# Patient Record
Sex: FEMALE | Race: WHITE | Employment: FULL TIME | URBAN - METROPOLITAN AREA
[De-identification: names, ages, dates, MRNs, and addresses within clinical notes are randomized per-mention and may not be internally consistent; named-entity substitution may affect disease eponyms.]

---

## 2020-12-10 ENCOUNTER — HOSPITAL ENCOUNTER (OUTPATIENT)
Dept: SURGERY | Age: 50
Discharge: HOME | End: 2020-12-10

## 2020-12-15 VITALS — WEIGHT: 201 LBS | HEIGHT: 67 IN | BODY MASS INDEX: 31.55 KG/M2

## 2020-12-15 RX ORDER — PEDI MULTIVIT 158/IRON/VIT K1 18MG-10MCG
1 TABLET,CHEWABLE ORAL
COMMUNITY

## 2020-12-15 RX ORDER — TIZANIDINE 4 MG/1
4 TABLET ORAL
COMMUNITY
Start: 2020-12-14 | End: 2020-12-24

## 2020-12-15 RX ORDER — PANTOPRAZOLE SODIUM 20 MG/1
20 TABLET, DELAYED RELEASE ORAL DAILY
COMMUNITY

## 2020-12-15 NOTE — PERIOP NOTES
Patient verified name and     Order for consent not found in EHR and matches case posting; patient verified. Type II surgery    Labs per surgeon: not received. Labs per anesthesia protocol: hgb (pt with CBC,BMP results in EMR dated 2020-all within limits). EKG: not required per anesthesia protocol. A negative Covid swab result is required to proceed with surgery; The testing center is located at the [address], [place]. An appointment is required therefore the patient will be contacted by the Covid swab team. The testing clinic is closed from  for lunch and on weekends. For questions or concerns the patient should call [phone]. Appointment date/time 2020 at [phone] found in EHR and provided to patient. Patient informed of GYN class on 2020 at which time labs will be drawn. Patient will also receive all patient education and hospital approved surgical skin cleanser to use per hospital policy. Patient instructed to hold all vitamins 7 days prior to surgery and NSAIDS 5 days prior to surgery, patient verbalized understanding. Instructed to stop celebrex 5 days prior to surgery. Patient instructed to continue previous medications as prescribed prior to surgery and to take the following medications the day of surgery according to anesthesia guidelines with a small sip of water: protonix, lexapro. Patient answered medical/surgical history questions at their best of ability. All prior to admission medications documented in [facility].

## 2020-12-15 NOTE — PERIOP NOTES
PLEASE CONTINUE TAKING ALL PRESCRIPTION MEDICATIONS UP TO THE DAY OF SURGERY UNLESS OTHERWISE DIRECTED BELOW. DISCONTINUE all vitamins and supplements 7 days prior to surgery. DISCONTINUE Non-Steriodal Anti-Inflammatory (NSAIDS) such as Advil and Aleve 5 days prior to surgery. Home Medications to take  the day of surgery    lexapro  protonix           Home Medications   to Hold   celebrex- 5 days prior to surgery        Comments          *Visitor policy of 1 visitor per patient discussed. Please do not bring home medications with you on the day of surgery unless otherwise directed by your nurse. If you are instructed to bring home medications, please give them to your nurse as they will be administered by the nursing staff. If you have any questions, please call Zina Granados [phone]. A copy of this note was provided to the patient for reference.

## 2020-12-15 NOTE — PERIOP NOTES
Enhanced Recovery After GYN Surgery: non-diabetic patients    Drink Ensure Enlive - one bottle twice daily for five days starting on 12/31/2020 and stopping on 1/4/2021. Do not drink any Ensure Enlive the day before surgery 1/5/2021. Ensure Enlive is the preferred formula over other Ensure formulas as it is the only one that contains CaHMB which helps maintain and rebuild muscle health. It is recommended that you continue drinking this for one month after surgery. The night before surgery 1/5/2021, drink 2 bottles of the Ensure Pre-Surgery drink. The morning of surgery 1/6/2021, drink one bottle of the Ensure Pre-Surgery drink while on your way to the hospital. Drink this over 5-10 minutes. Drink nothing else after drinking the pre-surgical drink the morning of surgery. Bring your patient handbook with you to the hospital.    Things to remember:    1. You will be given clear liquids to drink, advancing diet as tolerated    2. You will be up and moving around with assistance 2-4 hours after surgery. 3. You will be given regularly scheduled pain medications (NSAIDS, Tylenol, Gabapentin) with narcotics as needed. 4. You may be able to go home that night if the surgeon okays and you are up and eating and drinking. Otherwise, your discharge will be the following morning around lunch time. 5. Continue drinking Ensure Enlive for 5 days after surgery.

## 2020-12-16 ENCOUNTER — HOSPITAL ENCOUNTER (OUTPATIENT)
Dept: SURGERY | Age: 50
Discharge: HOME | End: 2020-12-16
Attending: OBSTETRICS & GYNECOLOGY

## 2020-12-16 NOTE — PROGRESS NOTES
Patient attended ERAS/ GYN surgery orientation class today. Detailed instruction book regarding GYN surgery was provided at start of class. Class content included pre-operative instructions for surgery in the week prior to and day before surgery. Packet including Hibiclens and printed instructions on bathing was provided to patient. Detailed and printed diet instruction and presurgical drinks were also given to patient  in accordance with ERAS protocol. Detailed information was given regarding arriving at the hospital and instructions for the patient's day of surgery. Discussed recovery from surgery, hospital stay, pain management, and discharge. Reviewed recovery at home including pelvic rest, driving and activity restrictions, issues requiring call to physician etc. Chuck Palacios all questions in detail. Patient voices understanding of all.

## 2020-12-24 PROBLEM — D25.1 INTRAMURAL, SUBMUCOUS, AND SUBSEROUS LEIOMYOMA OF UTERUS: Status: ACTIVE | Noted: 2020-12-24

## 2020-12-24 PROBLEM — R10.2 PELVIC PAIN IN FEMALE: Status: ACTIVE | Noted: 2020-12-24

## 2020-12-24 PROBLEM — N80.9 ENDOMETRIOSIS DETERMINED BY LAPAROSCOPY: Status: ACTIVE | Noted: 2020-12-24

## 2020-12-24 PROBLEM — D25.0 INTRAMURAL, SUBMUCOUS, AND SUBSEROUS LEIOMYOMA OF UTERUS: Status: ACTIVE | Noted: 2020-12-24

## 2020-12-24 PROBLEM — D25.2 INTRAMURAL, SUBMUCOUS, AND SUBSEROUS LEIOMYOMA OF UTERUS: Status: ACTIVE | Noted: 2020-12-24

## 2020-12-30 ENCOUNTER — HOSPITAL ENCOUNTER (OUTPATIENT)
Dept: MAMMOGRAPHY | Age: 50
Discharge: HOME | End: 2020-12-30
Attending: OBSTETRICS & GYNECOLOGY
Payer: COMMERCIAL

## 2020-12-30 DIAGNOSIS — Z12.39 ENCOUNTER FOR SCREENING FOR MALIGNANT NEOPLASM OF BREAST, UNSPECIFIED SCREENING MODALITY: ICD-10-CM

## 2020-12-30 PROCEDURE — 77063 BREAST TOMOSYNTHESIS BI: CPT

## 2021-01-04 NOTE — H&P (VIEW-ONLY)
HISTORY OF PRESENT ILLNESS Irma Oden is a 53 y.o. female.  () with h/o endometriosis, seen recently with some recurrence of pevic pain symptoms. Had laparoscopy about 18 months ago for Endo, was also told she had a small fibroid at that time. Did well at first but symptoms are returning. Cycles are regular but having more dysmenorrhea and pain, starting a week prior to her cycles and lasting into her cycle. Especially on the left. Has taken naproxen which helps, but has had a previous gastric sleeve so she had to stop. We tried to rx celebrex but her insurance denied. Shayy Estrada  has a past medical history of Abnormal uterine bleeding (AUB), Allergic rhinitis, Anxiety, Depression, HSV infection, Iron deficiency anemia, and Pancreatitis due to common bile duct stone (2018). She also has no past medical history of Abnormal Papanicolaou smear of cervix, Aneurysm ([address] Arrhythmia, Arthritis, Asthma, Autoimmune disease ([address] CAD (coronary artery disease), Cancer ([address] Chronic kidney disease, Chronic obstructive pulmonary disease ([address] Chronic pain, Coagulation disorder ([address] Diabetes ([address] Difficult intubation, Endocarditis, GERD (gastroesophageal reflux disease), Heart failure ([address] Hypertension, Liver disease, Malignant hyperthermia due to anesthesia, Morbid obesity ([address] Nausea & vomiting, Nicotine vapor product user, Non-nicotine vapor product user, Pseudocholinesterase deficiency, PUD (peptic ulcer disease), Rheumatic fever, Seizures ([address] Sleep apnea, Stroke ([address] Thromboembolus ([address] or Thyroid disease. . 
 
OB History  Para Term  AB Living 1 1 1     1 SAB TAB Ectopic Molar Multiple Live Births           1  
  
# Outcome Date GA Lbr Kurtis/2nd Weight Sex Delivery Anes PTL Lv  
1 Term      Vag-Spont  Y SEVERIANO  
   
 
 Her surgeries include  has a past surgical history that includes pr lap,tubal cautery; hx gi; hx cholecystectomy; hx orthopaedic; hx orthopaedic; hx gyn (01/2019); and hx breast reduction (Bilateral). Mavis Pulliam Her current meds are Current Outpatient Medications on File Prior to Visit Medication Sig Dispense Refill  flintstones complete (FLINTSTONES) chewable tablet Take 1 Tab by mouth.  pantoprazole (PROTONIX) 20 mg tablet Take 20 mg by mouth daily.  escitalopram oxalate (Lexapro) 20 mg tablet Take 20 mg by mouth daily.  COLLAGEN by Does Not Apply route.  celecoxib (CELEBREX) 200 mg capsule Take 1 Cap by mouth two (2) times a day. 60 Cap 1 No current facility-administered medications on file prior to visit. No Known Allergies Family history is significant for family history includes Ovarian Cancer in her maternal aunt. Mavis Pulliam Minna Rutledge  reports that she has never smoked. She has never used smokeless tobacco. She reports that she does not drink alcohol or use drugs. HPI Review of Systems Constitutional: Negative. Negative for fever, malaise/fatigue and weight loss. Respiratory: Negative. Cardiovascular: Negative. Gastrointestinal: Positive for heartburn. Negative for blood in stool, constipation and diarrhea. Genitourinary: Negative. Negative for dysuria, frequency, hematuria and urgency. Musculoskeletal: Negative. Negative for falls. Skin: Negative. Neurological: Negative. Endo/Heme/Allergies: Negative. Psychiatric/Behavioral: Negative. Negative for depression. The patient is not nervous/anxious. All other systems reviewed and are negative. Blood pressure 120/76, height 5' 7\" (1.702 m), weight 200 lb 12.8 oz (91.1 kg), last menstrual period 12/04/2020. Body mass index is 31.45 kg/m². Physical Exam 
Exam conducted with a chaperone present. Constitutional:   
   General: She is not in acute distress. Appearance: She is well-developed. Eyes:  
   Extraocular Movements: Extraocular movements intact. Pupils: Pupils are equal, round, and reactive to light. Cardiovascular:  
   Rate and Rhythm: Normal rate and regular rhythm. Pulmonary:  
   Effort: Pulmonary effort is normal. No respiratory distress. Breath sounds: Normal breath sounds. No wheezing. Abdominal:  
   General: Abdomen is flat. There is no distension. Palpations: Abdomen is soft. Tenderness: There is no abdominal tenderness. There is no guarding or rebound. Genitourinary: 
   General: Normal vulva. Exam position: Lithotomy position. Vagina: Normal.  
   Cervix: Normal.  
   Uterus: Enlarged and tender. Skin: 
   General: Skin is warm and dry. Neurological:  
   General: No focal deficit present. Mental Status: She is alert and oriented to person, place, and time. Cranial Nerves: No cranial nerve deficit. Psychiatric:     
   Behavior: Behavior normal.     
   Thought Content: Thought content normal.     
   Judgment: Judgment normal.  
 
 
US obtained showing multiple fibroids, at least are measured. The largest is >3cm on left side. [place] ~ 250cc. There is also a 2.8cm Right ovary cyst that has a 2.8mm septation. Ca125 was WNL ASSESSMENT and PLAN Diagnoses and all orders for this visit: 1. Pelvic pain in female - d/t Endo and fibroids. 
-     AMB POC US, TRANSVAGINAL 2. Endometriosis determined by laparoscopy 3. Intramural, submucous, and subserous leiomyoma of uterus - fibroid etiology, prognosis and treatment options reviewed. Discussed NSAIDs, OCs (neither of which she can do), Acessa or hysterectomy. Pt would like to proceed with Hyst.  Rec robotic hyst, also concerned for recurrent Endo. Procedure and recovery discussed. ERAS discussed. Prefer ovarian preservation. 4. Cyst of right ovary - prob benign but will ck Ca-125 d/t FH.   
-      IN THE PRESENCE OF HAMA Preop Visit Ms. Rebeca Sargent presents for a preop visit. She is scheduled for a Total Laparoscopic Hysterectomy, Bilateral Salpingectomy and Robotic. Aramis Silva Her history, meds, and allergies were reviewed. The procedure was reviewed in detail as well as the risks of bleeding, infection, DVT and potential surgical complications involving the bladder, ureters, colon or intestines. Also the alternatives,  benefits, recovery and follow-up. Prevention of DVT & SSI discussed as indicated. All of her questions were answered. Lauryn Alcala MD 
January 4, 2021 Aramis Silva Follow-up and Dispositions · Return in 25 days (on 1/18/2021) for Postop.

## 2021-01-05 ENCOUNTER — ANESTHESIA EVENT (OUTPATIENT)
Dept: SURGERY | Age: 51
End: 2021-01-05
Payer: COMMERCIAL

## 2021-01-06 ENCOUNTER — ANESTHESIA (OUTPATIENT)
Dept: SURGERY | Age: 51
End: 2021-01-06
Payer: COMMERCIAL

## 2021-01-06 ENCOUNTER — HOSPITAL ENCOUNTER (OUTPATIENT)
Age: 51
Discharge: HOME | End: 2021-01-06
Attending: OBSTETRICS & GYNECOLOGY | Admitting: OBSTETRICS & GYNECOLOGY
Payer: COMMERCIAL

## 2021-01-06 VITALS
BODY MASS INDEX: 31.32 KG/M2 | DIASTOLIC BLOOD PRESSURE: 68 MMHG | WEIGHT: 200 LBS | HEART RATE: 87 BPM | OXYGEN SATURATION: 95 % | TEMPERATURE: 97.6 F | RESPIRATION RATE: 20 BRPM | SYSTOLIC BLOOD PRESSURE: 110 MMHG

## 2021-01-06 DIAGNOSIS — Z90.710 S/P HYSTERECTOMY: ICD-10-CM

## 2021-01-06 DIAGNOSIS — G89.18 POSTOPERATIVE PAIN: Primary | ICD-10-CM

## 2021-01-06 LAB
ABO + RH BLD: NORMAL
BLOOD GROUP ANTIBODIES SERPL: NORMAL
HCG UR QL: NEGATIVE
SPECIMEN EXP DATE BLD: NORMAL

## 2021-01-06 PROCEDURE — 77030031139 HC SUT VCRL2 J&J -A: Performed by: OBSTETRICS & GYNECOLOGY

## 2021-01-06 PROCEDURE — 77030022704 HC SUT VLOC COVD -B: Performed by: OBSTETRICS & GYNECOLOGY

## 2021-01-06 PROCEDURE — 77030010545: Performed by: OBSTETRICS & GYNECOLOGY

## 2021-01-06 PROCEDURE — 77030014064 HC TIP MANIP UTER COOP -C: Performed by: OBSTETRICS & GYNECOLOGY

## 2021-01-06 PROCEDURE — 74011000250 HC RX REV CODE- 250: Performed by: NURSE ANESTHETIST, CERTIFIED REGISTERED

## 2021-01-06 PROCEDURE — C2628 CATHETER, OCCLUSION: HCPCS | Performed by: OBSTETRICS & GYNECOLOGY

## 2021-01-06 PROCEDURE — 77030020703 HC SEAL CANN DISP INTU -B: Performed by: OBSTETRICS & GYNECOLOGY

## 2021-01-06 PROCEDURE — 74011000250 HC RX REV CODE- 250: Performed by: ANESTHESIOLOGY

## 2021-01-06 PROCEDURE — 74011250636 HC RX REV CODE- 250/636: Performed by: ANESTHESIOLOGY

## 2021-01-06 PROCEDURE — 77030016151 HC PROTCTR LNS DFOG COVD -B: Performed by: OBSTETRICS & GYNECOLOGY

## 2021-01-06 PROCEDURE — 77030034696 HC CATH URETH FOL 2W BARD -A: Performed by: OBSTETRICS & GYNECOLOGY

## 2021-01-06 PROCEDURE — 74011250636 HC RX REV CODE- 250/636: Performed by: OBSTETRICS & GYNECOLOGY

## 2021-01-06 PROCEDURE — 74011250636 HC RX REV CODE- 250/636: Performed by: NURSE ANESTHETIST, CERTIFIED REGISTERED

## 2021-01-06 PROCEDURE — 77030019927 HC TBNG IRR CYSTO BAXT -A: Performed by: OBSTETRICS & GYNECOLOGY

## 2021-01-06 PROCEDURE — 77030040361 HC SLV COMPR DVT MDII -B: Performed by: OBSTETRICS & GYNECOLOGY

## 2021-01-06 PROCEDURE — 86901 BLOOD TYPING SEROLOGIC RH(D): CPT

## 2021-01-06 PROCEDURE — 76210000006 HC OR PH I REC 0.5 TO 1 HR: Performed by: OBSTETRICS & GYNECOLOGY

## 2021-01-06 PROCEDURE — 2709999900 HC NON-CHARGEABLE SUPPLY: Performed by: OBSTETRICS & GYNECOLOGY

## 2021-01-06 PROCEDURE — 77030011266 HC ELECTRD BLD INSL COVD -A: Performed by: OBSTETRICS & GYNECOLOGY

## 2021-01-06 PROCEDURE — 2709999900 HC NON-CHARGEABLE SUPPLY

## 2021-01-06 PROCEDURE — 88305 TISSUE EXAM BY PATHOLOGIST: CPT

## 2021-01-06 PROCEDURE — 77030010507 HC ADH SKN DERMBND J&J -B: Performed by: OBSTETRICS & GYNECOLOGY

## 2021-01-06 PROCEDURE — 76010000877 HC OR TIME 2.5 TO 3HR INTENSV - TIER 2: Performed by: OBSTETRICS & GYNECOLOGY

## 2021-01-06 PROCEDURE — 76060000036 HC ANESTHESIA 2.5 TO 3 HR: Performed by: OBSTETRICS & GYNECOLOGY

## 2021-01-06 PROCEDURE — 77030037088 HC TUBE ENDOTRACH ORAL NSL COVD-A: Performed by: ANESTHESIOLOGY

## 2021-01-06 PROCEDURE — 88307 TISSUE EXAM BY PATHOLOGIST: CPT

## 2021-01-06 PROCEDURE — 74011250637 HC RX REV CODE- 250/637: Performed by: ANESTHESIOLOGY

## 2021-01-06 PROCEDURE — 77030040830 HC CATH URETH FOL MDII -A: Performed by: OBSTETRICS & GYNECOLOGY

## 2021-01-06 PROCEDURE — 74011250637 HC RX REV CODE- 250/637: Performed by: OBSTETRICS & GYNECOLOGY

## 2021-01-06 PROCEDURE — 77030039425 HC BLD LARYNG TRULITE DISP TELE -A: Performed by: ANESTHESIOLOGY

## 2021-01-06 PROCEDURE — 77030003029 HC SUT VCRL J&J -B: Performed by: OBSTETRICS & GYNECOLOGY

## 2021-01-06 PROCEDURE — 77030014007 HC SPNG HEMSTAT J&J -B: Performed by: OBSTETRICS & GYNECOLOGY

## 2021-01-06 PROCEDURE — 77030035277 HC OBTRTR BLDELSS DISP INTU -B: Performed by: OBSTETRICS & GYNECOLOGY

## 2021-01-06 PROCEDURE — 81025 URINE PREGNANCY TEST: CPT

## 2021-01-06 PROCEDURE — 77030031492 HC PRT ACC BLNT AIRSEAL CNMD -B: Performed by: OBSTETRICS & GYNECOLOGY

## 2021-01-06 PROCEDURE — 77030003578 HC NDL INSUF VERES AMR -B: Performed by: OBSTETRICS & GYNECOLOGY

## 2021-01-06 PROCEDURE — 77030008756 HC TU IRR SUC STRY -B: Performed by: OBSTETRICS & GYNECOLOGY

## 2021-01-06 PROCEDURE — 77030035279 HC SEAL VSL ENDOWR XI INTU -I2: Performed by: OBSTETRICS & GYNECOLOGY

## 2021-01-06 PROCEDURE — 77030040922 HC BLNKT HYPOTHRM STRY -A: Performed by: ANESTHESIOLOGY

## 2021-01-06 RX ORDER — KETAMINE HYDROCHLORIDE 50 MG/ML
INJECTION, SOLUTION INTRAMUSCULAR; INTRAVENOUS AS NEEDED
Status: DISCONTINUED | OUTPATIENT
Start: 2021-01-06 | End: 2021-01-06 | Stop reason: HOSPADM

## 2021-01-06 RX ORDER — OXYCODONE HYDROCHLORIDE 5 MG/1
5-10 TABLET ORAL
Status: DISCONTINUED | OUTPATIENT
Start: 2021-01-06 | End: 2021-01-06 | Stop reason: HOSPADM

## 2021-01-06 RX ORDER — EPHEDRINE SULFATE/0.9% NACL/PF 50 MG/5 ML
SYRINGE (ML) INTRAVENOUS AS NEEDED
Status: DISCONTINUED | OUTPATIENT
Start: 2021-01-06 | End: 2021-01-06 | Stop reason: HOSPADM

## 2021-01-06 RX ORDER — NALOXONE HYDROCHLORIDE 0.4 MG/ML
0.2 INJECTION, SOLUTION INTRAMUSCULAR; INTRAVENOUS; SUBCUTANEOUS AS NEEDED
Status: DISCONTINUED | OUTPATIENT
Start: 2021-01-06 | End: 2021-01-06 | Stop reason: HOSPADM

## 2021-01-06 RX ORDER — HALOPERIDOL 5 MG/ML
1 INJECTION INTRAMUSCULAR
Status: DISCONTINUED | OUTPATIENT
Start: 2021-01-06 | End: 2021-01-06 | Stop reason: HOSPADM

## 2021-01-06 RX ORDER — SCOLOPAMINE TRANSDERMAL SYSTEM 1 MG/1
1 PATCH, EXTENDED RELEASE TRANSDERMAL ONCE
Status: DISCONTINUED | OUTPATIENT
Start: 2021-01-06 | End: 2021-01-06 | Stop reason: HOSPADM

## 2021-01-06 RX ORDER — NEOSTIGMINE METHYLSULFATE 1 MG/ML
INJECTION, SOLUTION INTRAVENOUS AS NEEDED
Status: DISCONTINUED | OUTPATIENT
Start: 2021-01-06 | End: 2021-01-06 | Stop reason: HOSPADM

## 2021-01-06 RX ORDER — ENOXAPARIN SODIUM 100 MG/ML
40 INJECTION SUBCUTANEOUS EVERY 24 HOURS
Status: DISCONTINUED | OUTPATIENT
Start: 2021-01-06 | End: 2021-01-06 | Stop reason: HOSPADM

## 2021-01-06 RX ORDER — PROPOFOL 10 MG/ML
INJECTION, EMULSION INTRAVENOUS AS NEEDED
Status: DISCONTINUED | OUTPATIENT
Start: 2021-01-06 | End: 2021-01-06 | Stop reason: HOSPADM

## 2021-01-06 RX ORDER — GLYCOPYRROLATE 0.2 MG/ML
INJECTION INTRAMUSCULAR; INTRAVENOUS AS NEEDED
Status: DISCONTINUED | OUTPATIENT
Start: 2021-01-06 | End: 2021-01-06 | Stop reason: HOSPADM

## 2021-01-06 RX ORDER — FENTANYL CITRATE 50 UG/ML
INJECTION, SOLUTION INTRAMUSCULAR; INTRAVENOUS AS NEEDED
Status: DISCONTINUED | OUTPATIENT
Start: 2021-01-06 | End: 2021-01-06 | Stop reason: HOSPADM

## 2021-01-06 RX ORDER — ACETAMINOPHEN 500 MG
1000 TABLET ORAL ONCE
Status: COMPLETED | OUTPATIENT
Start: 2021-01-06 | End: 2021-01-06

## 2021-01-06 RX ORDER — CELECOXIB 200 MG/1
400 CAPSULE ORAL 2 TIMES DAILY
Qty: 40 CAP | Refills: 0 | Status: SHIPPED | OUTPATIENT
Start: 2021-01-06 | End: 2021-01-18 | Stop reason: SDUPTHER

## 2021-01-06 RX ORDER — OXYCODONE HYDROCHLORIDE 5 MG/1
5 TABLET ORAL
Status: COMPLETED | OUTPATIENT
Start: 2021-01-06 | End: 2021-01-06

## 2021-01-06 RX ORDER — CEFAZOLIN SODIUM/WATER 2 G/20 ML
2 SYRINGE (ML) INTRAVENOUS ONCE
Status: COMPLETED | OUTPATIENT
Start: 2021-01-06 | End: 2021-01-06

## 2021-01-06 RX ORDER — OXYCODONE AND ACETAMINOPHEN 5; 325 MG/1; MG/1
1 TABLET ORAL
Qty: 12 TAB | Refills: 0 | Status: SHIPPED | OUTPATIENT
Start: 2021-01-06 | End: 2021-01-11

## 2021-01-06 RX ORDER — CELECOXIB 100 MG/1
100 CAPSULE ORAL 2 TIMES DAILY
Status: DISCONTINUED | OUTPATIENT
Start: 2021-01-07 | End: 2021-01-06 | Stop reason: HOSPADM

## 2021-01-06 RX ORDER — NALOXONE HYDROCHLORIDE 0.4 MG/ML
0.4 INJECTION, SOLUTION INTRAMUSCULAR; INTRAVENOUS; SUBCUTANEOUS AS NEEDED
Status: DISCONTINUED | OUTPATIENT
Start: 2021-01-06 | End: 2021-01-06 | Stop reason: HOSPADM

## 2021-01-06 RX ORDER — ROCURONIUM BROMIDE 10 MG/ML
INJECTION, SOLUTION INTRAVENOUS AS NEEDED
Status: DISCONTINUED | OUTPATIENT
Start: 2021-01-06 | End: 2021-01-06 | Stop reason: HOSPADM

## 2021-01-06 RX ORDER — ONDANSETRON 4 MG/1
4 TABLET, ORALLY DISINTEGRATING ORAL
Status: DISCONTINUED | OUTPATIENT
Start: 2021-01-06 | End: 2021-01-06 | Stop reason: HOSPADM

## 2021-01-06 RX ORDER — SODIUM CHLORIDE, SODIUM LACTATE, POTASSIUM CHLORIDE, CALCIUM CHLORIDE 600; 310; 30; 20 MG/100ML; MG/100ML; MG/100ML; MG/100ML
40 INJECTION, SOLUTION INTRAVENOUS CONTINUOUS
Status: DISCONTINUED | OUTPATIENT
Start: 2021-01-06 | End: 2021-01-06 | Stop reason: HOSPADM

## 2021-01-06 RX ORDER — MIDAZOLAM HYDROCHLORIDE 1 MG/ML
2 INJECTION, SOLUTION INTRAMUSCULAR; INTRAVENOUS
Status: COMPLETED | OUTPATIENT
Start: 2021-01-06 | End: 2021-01-06

## 2021-01-06 RX ORDER — LIDOCAINE HYDROCHLORIDE 10 MG/ML
0.1 INJECTION INFILTRATION; PERINEURAL AS NEEDED
Status: DISCONTINUED | OUTPATIENT
Start: 2021-01-06 | End: 2021-01-06 | Stop reason: HOSPADM

## 2021-01-06 RX ORDER — LIDOCAINE HYDROCHLORIDE 20 MG/ML
INJECTION, SOLUTION EPIDURAL; INFILTRATION; INTRACAUDAL; PERINEURAL AS NEEDED
Status: DISCONTINUED | OUTPATIENT
Start: 2021-01-06 | End: 2021-01-06 | Stop reason: HOSPADM

## 2021-01-06 RX ORDER — DEXAMETHASONE SODIUM PHOSPHATE 4 MG/ML
INJECTION, SOLUTION INTRA-ARTICULAR; INTRALESIONAL; INTRAMUSCULAR; INTRAVENOUS; SOFT TISSUE AS NEEDED
Status: DISCONTINUED | OUTPATIENT
Start: 2021-01-06 | End: 2021-01-06 | Stop reason: HOSPADM

## 2021-01-06 RX ORDER — GABAPENTIN 100 MG/1
100 CAPSULE ORAL ONCE
Status: COMPLETED | OUTPATIENT
Start: 2021-01-06 | End: 2021-01-06

## 2021-01-06 RX ORDER — ONDANSETRON 2 MG/ML
INJECTION INTRAMUSCULAR; INTRAVENOUS AS NEEDED
Status: DISCONTINUED | OUTPATIENT
Start: 2021-01-06 | End: 2021-01-06 | Stop reason: HOSPADM

## 2021-01-06 RX ORDER — SODIUM CHLORIDE, SODIUM LACTATE, POTASSIUM CHLORIDE, CALCIUM CHLORIDE 600; 310; 30; 20 MG/100ML; MG/100ML; MG/100ML; MG/100ML
25 INJECTION, SOLUTION INTRAVENOUS CONTINUOUS
Status: DISCONTINUED | OUTPATIENT
Start: 2021-01-06 | End: 2021-01-06 | Stop reason: HOSPADM

## 2021-01-06 RX ORDER — SODIUM CHLORIDE 9 MG/ML
INJECTION, SOLUTION INTRAVENOUS
Status: DISCONTINUED | OUTPATIENT
Start: 2021-01-06 | End: 2021-01-06 | Stop reason: HOSPADM

## 2021-01-06 RX ORDER — SODIUM CHLORIDE, SODIUM LACTATE, POTASSIUM CHLORIDE, CALCIUM CHLORIDE 600; 310; 30; 20 MG/100ML; MG/100ML; MG/100ML; MG/100ML
75 INJECTION, SOLUTION INTRAVENOUS CONTINUOUS
Status: DISCONTINUED | OUTPATIENT
Start: 2021-01-06 | End: 2021-01-06 | Stop reason: HOSPADM

## 2021-01-06 RX ORDER — GABAPENTIN 300 MG/1
300 CAPSULE ORAL 3 TIMES DAILY
Qty: 15 CAP | Refills: 0 | Status: SHIPPED | OUTPATIENT
Start: 2021-01-06 | End: 2021-01-11

## 2021-01-06 RX ORDER — HYDROMORPHONE HYDROCHLORIDE 2 MG/ML
0.5 INJECTION, SOLUTION INTRAMUSCULAR; INTRAVENOUS; SUBCUTANEOUS
Status: COMPLETED | OUTPATIENT
Start: 2021-01-06 | End: 2021-01-06

## 2021-01-06 RX ORDER — ACETAMINOPHEN 500 MG
1000 TABLET ORAL EVERY 6 HOURS
Status: DISCONTINUED | OUTPATIENT
Start: 2021-01-06 | End: 2021-01-06 | Stop reason: HOSPADM

## 2021-01-06 RX ORDER — GABAPENTIN 300 MG/1
300 CAPSULE ORAL 3 TIMES DAILY
Status: DISCONTINUED | OUTPATIENT
Start: 2021-01-06 | End: 2021-01-06 | Stop reason: HOSPADM

## 2021-01-06 RX ORDER — SUCCINYLCHOLINE CHLORIDE 20 MG/ML
INJECTION INTRAMUSCULAR; INTRAVENOUS AS NEEDED
Status: DISCONTINUED | OUTPATIENT
Start: 2021-01-06 | End: 2021-01-06 | Stop reason: HOSPADM

## 2021-01-06 RX ADMIN — ONDANSETRON 4 MG: 2 INJECTION INTRAMUSCULAR; INTRAVENOUS at 10:10

## 2021-01-06 RX ADMIN — FENTANYL CITRATE 100 MCG: 50 INJECTION INTRAMUSCULAR; INTRAVENOUS at 07:55

## 2021-01-06 RX ADMIN — GLYCOPYRROLATE 0.4 MG: 0.2 INJECTION, SOLUTION INTRAMUSCULAR; INTRAVENOUS at 10:15

## 2021-01-06 RX ADMIN — Medication 5 MG: at 09:29

## 2021-01-06 RX ADMIN — ACETAMINOPHEN 1000 MG: 500 TABLET ORAL at 06:59

## 2021-01-06 RX ADMIN — KETAMINE HYDROCHLORIDE 40 MG: 50 INJECTION INTRAMUSCULAR; INTRAVENOUS at 07:55

## 2021-01-06 RX ADMIN — ROCURONIUM BROMIDE 40 MG: 10 INJECTION, SOLUTION INTRAVENOUS at 08:07

## 2021-01-06 RX ADMIN — SUCCINYLCHOLINE CHLORIDE 120 MG: 20 INJECTION, SOLUTION INTRAMUSCULAR; INTRAVENOUS at 07:55

## 2021-01-06 RX ADMIN — HYDROMORPHONE HYDROCHLORIDE 0.5 MG: 2 INJECTION, SOLUTION INTRAMUSCULAR; INTRAVENOUS; SUBCUTANEOUS at 10:53

## 2021-01-06 RX ADMIN — LIDOCAINE HYDROCHLORIDE 80 MG: 20 INJECTION, SOLUTION EPIDURAL; INFILTRATION; INTRACAUDAL; PERINEURAL at 07:55

## 2021-01-06 RX ADMIN — PROPOFOL 180 MG: 10 INJECTION, EMULSION INTRAVENOUS at 07:55

## 2021-01-06 RX ADMIN — ROCURONIUM BROMIDE 10 MG: 10 INJECTION, SOLUTION INTRAVENOUS at 07:55

## 2021-01-06 RX ADMIN — Medication 10 MG: at 09:47

## 2021-01-06 RX ADMIN — SODIUM CHLORIDE: 900 INJECTION, SOLUTION INTRAVENOUS at 07:51

## 2021-01-06 RX ADMIN — OXYCODONE HYDROCHLORIDE 5 MG: 5 TABLET ORAL at 11:11

## 2021-01-06 RX ADMIN — SODIUM CHLORIDE, SODIUM LACTATE, POTASSIUM CHLORIDE, AND CALCIUM CHLORIDE: 600; 310; 30; 20 INJECTION, SOLUTION INTRAVENOUS at 07:45

## 2021-01-06 RX ADMIN — HYDROMORPHONE HYDROCHLORIDE 0.5 MG: 2 INJECTION, SOLUTION INTRAMUSCULAR; INTRAVENOUS; SUBCUTANEOUS at 10:48

## 2021-01-06 RX ADMIN — ROCURONIUM BROMIDE 10 MG: 10 INJECTION, SOLUTION INTRAVENOUS at 09:10

## 2021-01-06 RX ADMIN — LIDOCAINE HYDROCHLORIDE 0.1 ML: 10 INJECTION, SOLUTION INFILTRATION; PERINEURAL at 07:12

## 2021-01-06 RX ADMIN — ACETAMINOPHEN 1000 MG: 500 TABLET ORAL at 14:55

## 2021-01-06 RX ADMIN — KETAMINE HYDROCHLORIDE 14 MG: 50 INJECTION INTRAMUSCULAR; INTRAVENOUS at 10:26

## 2021-01-06 RX ADMIN — MIDAZOLAM 2 MG: 1 INJECTION INTRAMUSCULAR; INTRAVENOUS at 07:30

## 2021-01-06 RX ADMIN — KETAMINE HYDROCHLORIDE 50 MG: 50 INJECTION INTRAMUSCULAR; INTRAVENOUS at 10:15

## 2021-01-06 RX ADMIN — DEXAMETHASONE SODIUM PHOSPHATE 8 MG: 4 INJECTION, SOLUTION INTRAMUSCULAR; INTRAVENOUS at 09:03

## 2021-01-06 RX ADMIN — SODIUM CHLORIDE, SODIUM LACTATE, POTASSIUM CHLORIDE, AND CALCIUM CHLORIDE 25 ML/HR: 600; 310; 30; 20 INJECTION, SOLUTION INTRAVENOUS at 07:07

## 2021-01-06 RX ADMIN — Medication 2 G: at 07:45

## 2021-01-06 RX ADMIN — Medication 3 MG: at 10:15

## 2021-01-06 RX ADMIN — SODIUM CHLORIDE, SODIUM LACTATE, POTASSIUM CHLORIDE, AND CALCIUM CHLORIDE: 600; 310; 30; 20 INJECTION, SOLUTION INTRAVENOUS at 09:42

## 2021-01-06 RX ADMIN — ONDANSETRON 4 MG: 4 TABLET, ORALLY DISINTEGRATING ORAL at 11:47

## 2021-01-06 RX ADMIN — GABAPENTIN 100 MG: 100 CAPSULE ORAL at 06:59

## 2021-01-06 RX ADMIN — Medication 10 MG: at 08:15

## 2021-01-06 NOTE — PROGRESS NOTES
Patient OOB to bathroom, able to void 150ml of blood-tinged urine. Patient tolerating PO fluids at present time. Will get VS at 1500, if VS WNL and patient denies dizziness, will discharge patient at that time. Patient and support person agree with POC.

## 2021-01-06 NOTE — PROGRESS NOTES
Chart reviewed - patient admitted for hysterectomy with tentative plans to d/c later today. No needs identified in chart review. SW will continue to follow.     VLADISLAV Tam-GINGER  [address] [facility]

## 2021-01-06 NOTE — ANESTHESIA PREPROCEDURE EVALUATION
Relevant Problems   No relevant active problems       Anesthetic History   No history of anesthetic complications            Review of Systems / Medical History  Patient summary reviewed and pertinent labs reviewed    Pulmonary  Within defined limits                 Neuro/Psych         Psychiatric history (Anxiety/Depression)     Cardiovascular                  Exercise tolerance: >4 METS  Comments: Denies CP, SOB or changes in functional status   GI/Hepatic/Renal  Within defined limits              Endo/Other  Within defined limits           Other Findings   Comments: Numerous procedures in the past  Gastric sleeve           Physical Exam    Airway  Mallampati: I  TM Distance: 4 - 6 cm  Neck ROM: normal range of motion   Mouth opening: Normal     Cardiovascular    Rhythm: regular  Rate: normal         Dental    Dentition: Caps/crowns     Pulmonary  Breath sounds clear to auscultation               Abdominal  GI exam deferred       Other Findings            Anesthetic Plan    ASA: 2  Anesthesia type: general          Induction: Intravenous and RSI  Anesthetic plan and risks discussed with: Patient and Family

## 2021-01-06 NOTE — PROGRESS NOTES
TRANSFER - IN REPORT:    Verbal report received from MICHELLE Villar on Andie Ast being received from PACU for routine progression of care      Report consisted of patients Situation, Background, Assessment and   Recommendations(SBAR). Information from the following report(s) SBAR, OR summary, MAR, results review was reviewed with the receiving nurse. Opportunity for questions and clarification was provided. Assessment completed upon patients arrival to unit and care assumed.

## 2021-01-06 NOTE — BRIEF OP NOTE
Brief Postoperative Note    Patient: Johny Zaragoza  YOB: 1970  MRN: [id number]    Date of Procedure: 1/6/2021     Pre-Op Diagnosis: Pelvic pain in female [R10.2]  Endometriosis [N80.9]  Fibroids, intramural [D25.1]    Post-Op Diagnosis: Same as preoperative diagnosis. Procedure(s):  ERAS / HYSTERECTOMY ROBOTIC ASSISTED; CYSTOSCOPY  PERITONEAL BIOPSIES, excision of peritoneal endometriosis. Cystoscopy. Surgeon(s):  Robert Sibley MD    Surgical Assistant: None    Anesthesia: General     Estimated Blood Loss (mL): 011JD    Complications: None    Specimens:   ID Type Source Tests Collected by Time Destination   1 : Peritoneal Biopsies Preservative   Robert Sibley MD 1/6/2021 1789 Pathology   2 : Uterus and cervix Fresh   Robert Sibley MD 1/6/2021 0076 Pathology        Implants: * No implants in log *    Drains: * No LDAs found *    Findings: Diffuse endometriosis, nodular peritoneal masses and deep peritoneal endo throughout pelvis and cul de sac. Filmy adhesions between colon and left adnexa, adhesions from bladder to anterior uterus, rectum to posterior cervix. No evidence of urinary tract injury on cysto.     Electronically Signed by Collin Luis MD on 1/6/2021 at 10:45 AM

## 2021-01-06 NOTE — PROGRESS NOTES
Patient to room via stretcher. Patient attempted to ambulate off of stretcher, patient has c/o dizziness, nausea. Assisted patient back to stretcher. Patient able to move from stretcher to bed. Patient in bed with covers on, c/o nausea. Zofran given PO for nausea.

## 2021-01-06 NOTE — INTERVAL H&P NOTE
Update History & Physical 
 
The Patient's History and Physical was reviewed with the patient and I examined the patient. There was no change. The surgical site was confirmed by the patient and me. Plan:  The risk, benefits, expected outcome, and alternative to the recommended procedure have been discussed with the patient. Patient understands and wants to proceed with the procedure.  
 
Electronically signed by Michaela Steele MD on 1/6/2021 at 7:41 AM

## 2021-01-06 NOTE — PROGRESS NOTES
Patient discharged to home per MD orders. Patient stable at discharge. IV removed and gaze placed. Taken to private vehicle via wheelchair by [facility] staff.

## 2021-01-06 NOTE — PERIOP NOTES
TRANSFER - OUT REPORT:    Verbal report given to [facility] (name) on Kareem Wolff  being transferred to [address] [place] [address] (unit) for routine post - op       Report consisted of patients Situation, Background, Assessment and   Recommendations(SBAR). Information from the following report(s) SBAR was reviewed with the receiving nurse. Lines:   Peripheral IV 01/06/21 Left Wrist (Active)   Site Assessment Clean, dry, & intact 01/06/21 1110   Phlebitis Assessment 0 01/06/21 1110   Infiltration Assessment 0 01/06/21 1110   Dressing Status Clean, dry, & intact 01/06/21 1110   Dressing Type Tape;Transparent 01/06/21 1110   Hub Color/Line Status Infusing 01/06/21 1110        Opportunity for questions and clarification was provided.       Patient transported with:   O2 @ 2 liters

## 2021-01-06 NOTE — ANESTHESIA POSTPROCEDURE EVALUATION
Procedure(s):  ERAS / HYSTERECTOMY ROBOTIC ASSISTED; CYSTOSCOPY  PERITONEAL BIOPSIES. general    Anesthesia Post Evaluation      Multimodal analgesia: multimodal analgesia used between 6 hours prior to anesthesia start to PACU discharge  Patient location during evaluation: bedside  Patient participation: complete - patient participated  Level of consciousness: awake and alert  Pain score: 2  Pain management: adequate  Airway patency: patent  Anesthetic complications: no  Cardiovascular status: acceptable  Respiratory status: acceptable  Hydration status: acceptable  Comments: Patient doing well. Continue care on floor.    Post anesthesia nausea and vomiting:  controlled  Final Post Anesthesia Temperature Assessment:  Normothermia (36.0-37.5 degrees C)      INITIAL Post-op Vital signs:   Vitals Value Taken Time   /69 01/06/21 1115   Temp 36.3 °C (97.3 °F) 01/06/21 1036   Pulse 82 01/06/21 1115   Resp 18 01/06/21 1115   SpO2 99 % 01/06/21 1115

## 2021-01-07 NOTE — DISCHARGE INSTRUCTIONS
Laparoscopic Hysterectomy: What to Expect at [facility] 1808 laparoscopic hysterectomy is surgery to take out the uterus. Your doctor put a lighted tube and surgical tools through small cuts in your belly to remove the uterus. You can expect to feel better and stronger each day. But you might need pain medicine for a week or two. You may get tired easily or have less energy than usual. The tiredness may last for several weeks after surgery. You will probably notice that your belly is swollen and puffy. This is common. The swelling will take several weeks to go down. You may take about 4 to 6 weeks to fully recover. It's important to avoid lifting while you are recovering so that you can heal.  This care sheet gives you a general idea about how long it will take for you to recover. But each person recovers at a different pace. Follow the steps below to get better as quickly as possible. How can you care for yourself at home? Activity    · Rest when you feel tired.     · Be active. Walking is a good choice.     · Allow the area to heal. Don't move quickly or lift anything heavy until you are feeling better.     · You may shower 24 to 48 hours after surgery, if your doctor okays it. Pat the incision dry. Do not take a bath for the first 2 weeks, or until your doctor tells you it is okay.     · Ask your doctor when it is okay for you to have sex. Diet    · You can eat your normal diet. If your stomach is upset, try bland, low-fat foods like plain rice, broiled chicken, toast, and yogurt.     · If your bowel movements are not regular right after surgery, try to avoid constipation and straining. Drink plenty of water. Your doctor may suggest fiber, a stool softener, or a mild laxative. Medicines    · Your doctor will tell you if and when you can restart your medicines.  He or she will also give you instructions about taking any new medicines.     · If you take aspirin or some other blood thinner, ask your doctor if and when to start taking it again. Make sure that you understand exactly what your doctor wants you to do.     · Be safe with medicines. Read and follow all instructions on the label. ? If the doctor gave you a prescription medicine for pain, take it as prescribed. ? If you are not taking a prescription pain medicine, ask your doctor if you can take an over-the-counter medicine.     · If your doctor prescribed antibiotics, take them as directed. Do not stop taking them just because you feel better. You need to take the full course of antibiotics. Incision care    · You may have stitches over the cuts (incisions) the doctor made in your belly.     · If you have strips of tape on the cut (incision) the doctor made, leave the tape on for a week or until it falls off.     · Wash the area daily with warm, soapy water, and pat it dry. Don't use hydrogen peroxide or alcohol. They can slow healing.     · You may cover the area with a gauze bandage if it oozes fluid or rubs against clothing.     · Change the bandage every day. Other instructions    · You may have some light vaginal bleeding. Wear sanitary pads if needed. Do not douche or use tampons.     · Don't have sex until the doctor says it is okay. Follow-up care is a key part of your treatment and safety. Be sure to make and go to all appointments, and call your doctor if you are having problems. It's also a good idea to know your test results and keep a list of the medicines you take. When should you call for help? Call 911 anytime you think you may need emergency care. For example, call if:    · You passed out (lost consciousness).     · You have chest pain, are short of breath, or cough up blood. Call your doctor now or seek immediate medical care if:    · You have pain that does not get better after you take pain medicine.     · You cannot pass stools or gas.     · You have vaginal discharge that has increased in amount or smells bad.   · You are sick to your stomach or cannot drink fluids.     · You have loose stitches, or your incision comes open.     · Bright red blood has soaked through the bandage over your incision.     · You have signs of infection, such as:  ? Increased pain, swelling, warmth, or redness. ? Red streaks leading from the incision. ? Pus draining from the incision. ? A fever.     · You have bright red vaginal bleeding that soaks one or more pads in an hour, or you have large clots.     · You have signs of a blood clot in your leg (called a deep vein thrombosis), such as:  ? Pain in your calf, back of the knee, thigh, or groin. ? Redness and swelling in your leg or groin. Watch closely for changes in your health, and be sure to contact your doctor if you have any problems. Where can you learn more? Go to http://www.gray.com/  Enter Q131 in the search box to learn more about \"Laparoscopic Hysterectomy: What to Expect at Home. \"  Current as of: November 8, 2019               Content Version: 12.6  © 2670-8459 [employer]. Care instructions adapted under license by [employer] (which disclaims liability or warranty for this information). If you have questions about a medical condition or this instruction, always ask your healthcare professional. [address] any warranty or liability for your use of this information.

## 2021-01-07 NOTE — OP NOTES
Operative Report    Patient: Kylee Coreas MRN: [id number]  SSN: [id number]    YOB: 1970  Age: 53 y.o. Sex: female      Date of Surgery: 1/6/2021      Preoperative Diagnosis: Pelvic pain in female [R10.2]  Endometriosis [N80.9]  Fibroids, intramural [D25.1]    Postoperative Diagnosis: Pelvic pain in female [R10.2]  Endometriosis [N80.9]  Fibroids, intramural [D25.1]    Surgeon(s):  Jose Luis Bermudez MD    Anesthesia: General    Procedure: 1. Robotic Assisted Total Laparoscopic Hysterectomy less than 250 grams. 2. Excision of multiple peritoneal lesions. 3. Lysis of adhesions    Findings:  fibroid uterus, enlarged uterus, normal appearing right ovary, cul de sac endometriosis, adhesion left adnexa to epiploica. Bladder to uterus. Filmy adhesions b/w rectum and cul de sac and multiple 5-10mm peritoneal masses in cul de sac and right ovarian fossa. Estimated Blood Loss:  150 mL    Drains: none    Specimens:    ID Type Source Tests Collected by Time Destination   1 : Peritoneal Biopsies Preservative   Jose Luis Bermudez MD 1/6/2021 2430 Pathology   2 : Uterus and cervix Fresh   Jose Luis Bermudez MD 1/6/2021 0779 Pathology       DVT Prophylaxis: [facility]    Antibiotic Prophylaxis: Ancef    Procedure Details:  After an adequate level of anesthesia was obtained, the patient was prepped and draped in the usual sterile fashion in the dorsal lithotomy position. Time out was done to confirm the operating procedure, surgeon, patient, pertinent data, and site. Once confirmed by the team, the procedure was started. A weighted speculum was placed in the vagina and the cervix was grasped with a tenaculum. The uterus was sounded to a depth of 10 cm and cervix dilated. The Rivka-Tracy manipulator was placed and balloon inflated. Instruments removed and gloves changed.  An umbilical incision was made after first injecting with 0.5% marcaine, and the Veress needle inserted insufflating the peritoneal cavity with CO2. Opening pressure was noted to be 6mmHg. Once the pressure reached 15mmHg, the Veress was removed and trocar was placed. Scope was inserted; there was no bleeding and no evidence of visceral injury. Under visualization one 8mm lateral port was placed on each side. 8mm assistant port was placed in the left upper quadrant. The robot was then attached to the trocars and the instruments were placed under visualization. I broke scrub and went to the console. Anatomic survey performed and findings noted as above. The ureters were identified and traced on both sides. Adhesions as described above were taken down sharply and bluntly with minimal cautery. There were 4 fibrous appearing peritoneal nodules from 5-10 mm in size in the cul de sac, left US ligament and right cul de sac (overlying the ureter). I carefully grasped and excised each lesions and they were removed through the assistant port. There was reasonable hemostasis. On each side, the ovarian ligament was clamped, coagulated, and divided using the Monopolar Scissors and Vessel Sealer. The dissection was then taken down through the  round ligament. The anterior and posterior peritoneum was taken down on each side, skeletonizing the uterine arteries. Bladder flap adhesions were taken down sharply and was advanced and the bladder dissected down below the Koh-ring. The uterine arteries were clamped, coagulated, and cut across the ascending branches. Cardinal ligament was developed. The cardinal ligaments were taken down to the Koh-Ring and the cervico-vaginal junction was circumscribed anteriorly and posteriorly. The uterus was removed. There were vigorous pumpers along the cuff. Ascepto bulb placed in the vagina to maintain pneumo. The vaginal cuff was closed with 0 V-Loc with running stitches. Ascepto bulb was removed. The area was irrigated free of blood clots and debris. Hemostasis was noted at all points. The robot was detached.  I again scrubbed and returned to the patient. Coello was removed. 70 degree scope was inserted. Bladder distended to accommodate approximately 150-200cc. No evidence of bladder injury and no sutures seen in the bladder. Both ureteral orifices visualized and ureteral patency confirmed with good urine efflux. Bladder drained and scope removed. Gloves changed and I returned to the abdomen. The pelvis was irrigated and suctioned. The pneumoperitoneum was reduced to approximately 8 mmHg for several minutes (after the cuff was closed) . There was some generalized oozing from the multiple areas of peritoneal dissection so surgicel powder was placed throughout the pelvis. Next we reduced the peritoneum, removed the trocars, and closed each of the port sites with Surgical adhesive on the skin. The patient tolerated the procedure well. She was cleaned up, returned to the flat supine position, awakened, and went to the PACU in stable condition. Counts were correct.     Signed By: Nba Nye MD     January 7, 2021

## 2021-01-07 NOTE — PROGRESS NOTES
Post op call. Pt stable without unusual complaints. Voiding well and tolerating reg diet. Reviewed surgery - Questions answered.

## 2022-03-18 PROBLEM — R10.2 PELVIC PAIN IN FEMALE: Status: ACTIVE | Noted: 2020-12-24

## 2022-03-19 PROBLEM — N80.9 ENDOMETRIOSIS DETERMINED BY LAPAROSCOPY: Status: ACTIVE | Noted: 2020-12-24

## 2022-03-20 PROBLEM — Z90.710 S/P HYSTERECTOMY: Status: ACTIVE | Noted: 2021-01-06

## 2022-03-20 PROBLEM — D25.2 INTRAMURAL, SUBMUCOUS, AND SUBSEROUS LEIOMYOMA OF UTERUS: Status: ACTIVE | Noted: 2020-12-24

## 2022-03-20 PROBLEM — D25.0 INTRAMURAL, SUBMUCOUS, AND SUBSEROUS LEIOMYOMA OF UTERUS: Status: ACTIVE | Noted: 2020-12-24

## 2022-03-20 PROBLEM — D25.1 INTRAMURAL, SUBMUCOUS, AND SUBSEROUS LEIOMYOMA OF UTERUS: Status: ACTIVE | Noted: 2020-12-24

## 2022-04-13 PROBLEM — D25.2 INTRAMURAL, SUBMUCOUS, AND SUBSEROUS LEIOMYOMA OF UTERUS: Status: RESOLVED | Noted: 2020-12-24 | Resolved: 2022-04-13

## 2022-04-13 PROBLEM — Z90.710 S/P HYSTERECTOMY: Status: RESOLVED | Noted: 2021-01-06 | Resolved: 2022-04-13

## 2022-04-13 PROBLEM — D25.0 INTRAMURAL, SUBMUCOUS, AND SUBSEROUS LEIOMYOMA OF UTERUS: Status: RESOLVED | Noted: 2020-12-24 | Resolved: 2022-04-13

## 2022-04-13 PROBLEM — N80.9 ENDOMETRIOSIS DETERMINED BY LAPAROSCOPY: Status: RESOLVED | Noted: 2020-12-24 | Resolved: 2022-04-13

## 2022-04-13 PROBLEM — R10.2 PELVIC PAIN IN FEMALE: Status: RESOLVED | Noted: 2020-12-24 | Resolved: 2022-04-13

## 2022-04-13 PROBLEM — D25.1 INTRAMURAL, SUBMUCOUS, AND SUBSEROUS LEIOMYOMA OF UTERUS: Status: RESOLVED | Noted: 2020-12-24 | Resolved: 2022-04-13

## 2022-06-07 ENCOUNTER — HOSPITAL ENCOUNTER (OUTPATIENT)
Dept: MAMMOGRAPHY | Age: 52
Discharge: HOME | End: 2022-06-10
Payer: COMMERCIAL

## 2022-06-07 DIAGNOSIS — Z12.31 VISIT FOR SCREENING MAMMOGRAM: ICD-10-CM

## 2022-06-07 PROCEDURE — 77063 BREAST TOMOSYNTHESIS BI: CPT

## 2022-06-14 ENCOUNTER — TELEPHONE (OUTPATIENT)
Dept: OBGYN CLINIC | Age: 52
End: 2022-06-14

## 2022-06-15 ENCOUNTER — TELEPHONE (OUTPATIENT)
Dept: OBGYN CLINIC | Age: 52
End: 2022-06-15

## 2022-06-15 NOTE — TELEPHONE ENCOUNTER
Pt request for  to review labs preformed by PCP manish/ [facility] on 6/8/22. Labs in [facility]. Pt notified Dewey Morgan is out of the office but will review them when he returns next week.

## 2022-06-16 ENCOUNTER — HOSPITAL ENCOUNTER (OUTPATIENT)
Dept: MAMMOGRAPHY | Age: 52
Discharge: HOME | End: 2022-06-19
Payer: COMMERCIAL

## 2022-06-16 ENCOUNTER — APPOINTMENT (OUTPATIENT)
Dept: MAMMOGRAPHY | Age: 52
End: 2022-06-16
Payer: COMMERCIAL

## 2022-06-16 ENCOUNTER — TELEPHONE (OUTPATIENT)
Dept: OBGYN CLINIC | Age: 52
End: 2022-06-16

## 2022-06-16 DIAGNOSIS — R92.8 ABNORMAL SCREENING MAMMOGRAM: ICD-10-CM

## 2022-06-16 PROCEDURE — 77065 DX MAMMO INCL CAD UNI: CPT

## 2022-06-16 NOTE — TELEPHONE ENCOUNTER
Pt called requesting to schedule an ultrasound appointment to follow up on a right ovarian cyst. Pt is concerned about lab work from PCP, wants labs to be reviewed by Dr. Gunjan Magana as soon as possible. Pt notified that a staff message has been sent to him and he will review them after he is back in town next week. Pt scheduled for US of R ovary on 7/12 per note. Pt states would like to see if her PCP can get this ultrasound done sooner for her, but has agreed to be placed on schedule for 7/12. Pt has been notified that this is the earliest available for both ultrasound and Dr. Gunjan Magana. Pt verbalized understanding, has no further questions at this time.

## 2024-06-06 ENCOUNTER — OFFICE VISIT (OUTPATIENT)
Dept: OBGYN CLINIC | Age: 54
End: 2024-06-06
Payer: COMMERCIAL

## 2024-06-06 VITALS
DIASTOLIC BLOOD PRESSURE: 74 MMHG | BODY MASS INDEX: 36.88 KG/M2 | HEIGHT: 67 IN | WEIGHT: 235 LBS | SYSTOLIC BLOOD PRESSURE: 118 MMHG

## 2024-06-06 DIAGNOSIS — Z90.710 POST-HYSTERECTOMY MENOPAUSE: Primary | ICD-10-CM

## 2024-06-06 DIAGNOSIS — Z80.41 FAMILY HISTORY OF OVARIAN CANCER: ICD-10-CM

## 2024-06-06 DIAGNOSIS — R68.82 LOW LIBIDO: ICD-10-CM

## 2024-06-06 DIAGNOSIS — E89.40 POST-HYSTERECTOMY MENOPAUSE: Primary | ICD-10-CM

## 2024-06-06 DIAGNOSIS — N95.1 SYMPTOMS, SUCH AS FLUSHING, SLEEPLESSNESS, HEADACHE, LACK OF CONCENTRATION, ASSOCIATED WITH THE MENOPAUSE: ICD-10-CM

## 2024-06-06 DIAGNOSIS — R23.2 HOT FLASHES: ICD-10-CM

## 2024-06-06 DIAGNOSIS — R32 URINARY INCONTINENCE, UNSPECIFIED TYPE: ICD-10-CM

## 2024-06-06 DIAGNOSIS — M62.08 DIASTASIS RECTI: ICD-10-CM

## 2024-06-06 PROBLEM — M06.09 RHEUMATOID ARTHRITIS OF MULTIPLE SITES WITH NEGATIVE RHEUMATOID FACTOR (HCC): Status: ACTIVE | Noted: 2019-11-07

## 2024-06-06 PROBLEM — J30.9 ALLERGIC RHINITIS: Status: ACTIVE | Noted: 2019-10-17

## 2024-06-06 PROBLEM — F41.1 GENERALIZED ANXIETY DISORDER: Status: ACTIVE | Noted: 2021-11-16

## 2024-06-06 PROBLEM — M15.9 PRIMARY OSTEOARTHRITIS INVOLVING MULTIPLE JOINTS: Status: ACTIVE | Noted: 2022-09-06

## 2024-06-06 PROBLEM — E04.2 MULTIPLE THYROID NODULES: Status: ACTIVE | Noted: 2021-07-02

## 2024-06-06 PROBLEM — D50.8 IRON DEFICIENCY ANEMIA SECONDARY TO INADEQUATE DIETARY IRON INTAKE: Status: ACTIVE | Noted: 2019-11-07

## 2024-06-06 PROBLEM — F41.9 ANXIETY: Status: ACTIVE | Noted: 2024-06-06

## 2024-06-06 PROBLEM — I83.812 VARICOSE VEINS OF LOWER EXTREMITY WITH PAIN, LEFT: Status: ACTIVE | Noted: 2021-12-01

## 2024-06-06 PROBLEM — F32.A DEPRESSIVE DISORDER: Status: ACTIVE | Noted: 2024-06-06

## 2024-06-06 PROBLEM — E66.01 MORBID (SEVERE) OBESITY DUE TO EXCESS CALORIES (HCC): Status: ACTIVE | Noted: 2017-03-20

## 2024-06-06 PROBLEM — F32.4 MAJOR DEPRESSIVE DISORDER WITH SINGLE EPISODE, IN PARTIAL REMISSION (HCC): Chronic | Status: ACTIVE | Noted: 2018-12-09

## 2024-06-06 PROBLEM — M15.0 PRIMARY OSTEOARTHRITIS INVOLVING MULTIPLE JOINTS: Status: ACTIVE | Noted: 2022-09-06

## 2024-06-06 PROBLEM — K21.9 GASTROESOPHAGEAL REFLUX DISEASE: Status: ACTIVE | Noted: 2020-02-12

## 2024-06-06 PROBLEM — Z98.84 STATUS POST BARIATRIC SURGERY: Status: ACTIVE | Noted: 2021-07-05

## 2024-06-06 PROBLEM — F33.1 MODERATE EPISODE OF RECURRENT MAJOR DEPRESSIVE DISORDER (HCC): Status: ACTIVE | Noted: 2021-07-02

## 2024-06-06 PROCEDURE — 99214 OFFICE O/P EST MOD 30 MIN: CPT | Performed by: OBSTETRICS & GYNECOLOGY

## 2024-06-06 RX ORDER — TIZANIDINE 4 MG/1
4 TABLET ORAL DAILY PRN
COMMUNITY
Start: 2023-05-16

## 2024-06-06 RX ORDER — ESTRADIOL 0.05 MG/D
1 PATCH TRANSDERMAL WEEKLY
Qty: 12 PATCH | Refills: 4 | Status: SHIPPED | OUTPATIENT
Start: 2024-06-06

## 2024-06-06 RX ORDER — PERPHENAZINE 4 MG
TABLET ORAL
COMMUNITY

## 2024-06-06 SDOH — ECONOMIC STABILITY: FOOD INSECURITY: WITHIN THE PAST 12 MONTHS, YOU WORRIED THAT YOUR FOOD WOULD RUN OUT BEFORE YOU GOT MONEY TO BUY MORE.: NEVER TRUE

## 2024-06-06 SDOH — ECONOMIC STABILITY: INCOME INSECURITY: HOW HARD IS IT FOR YOU TO PAY FOR THE VERY BASICS LIKE FOOD, HOUSING, MEDICAL CARE, AND HEATING?: NOT VERY HARD

## 2024-06-06 SDOH — ECONOMIC STABILITY: HOUSING INSECURITY
IN THE LAST 12 MONTHS, WAS THERE A TIME WHEN YOU DID NOT HAVE A STEADY PLACE TO SLEEP OR SLEPT IN A SHELTER (INCLUDING NOW)?: NO

## 2024-06-06 SDOH — ECONOMIC STABILITY: FOOD INSECURITY: WITHIN THE PAST 12 MONTHS, THE FOOD YOU BOUGHT JUST DIDN'T LAST AND YOU DIDN'T HAVE MONEY TO GET MORE.: NEVER TRUE

## 2024-06-06 NOTE — PROGRESS NOTES
2024      Mimi Francis  : 1970  54 y.o.      HPI: wants to discuss HRT. Had hysterectomy by Arianna. . RTLH for endo and adhesions.  Started noting significant hot flashes more frequently and more severe 6mos ago. More night sweats, fatigue.  Pt has many medical problems including RA, obesity. 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     Also wants u/s to ck her one ovary. Pt had one removed prior to the hyst. She does not know which.   Fam hx ovarian cancer. Mat aunt in her 50s.     No hx breast ca or dvt/pe  Will be having sx for pannus reduction in August.    Exam:  /74   Ht 1.702 m (5' 7\")   Wt 106.6 kg (235 lb)   BMI 36.81 kg/m²     Body mass index is 36.81 kg/m².    Pt in no distress. Alert and oriented x3. Affect bright.  Well developed, well nourished. Bmi 36  HEENT: normocephalic, atraumatic. Sclerae nonicteric.  Neuro: grossly intact    Mimi was seen today for problem visit.    Diagnoses and all orders for this visit:    Post-hysterectomy menopause    Hot flashes  -     estradiol (CLIMARA) 0.05 MG/24HR; Place 1 patch onto the skin once a week    Low libido    Symptoms, such as flushing, sleeplessness, headache, lack of concentration, associated with the menopause  -     estradiol (CLIMARA) 0.05 MG/24HR; Place 1 patch onto the skin once a week    Family history of ovarian cancer    Urinary incontinence, unspecified type  -     [facility] - Physical Therapy, [facility]    Diastasis recti  -     [facility] - Physical Therapy, [facility]     We talked about WHI study results. E alone not as concerning as combined HRT, but our gen sx colleagues and oncologists still feel ERT increases breast cancer risk.   Also reviewed increased clotting risk with ERT. Use non PO route if possible to decrease 1st pass effect. She feels QOL is really bad right now. Wants to do a 6 wks trial to see if she

## 2024-07-23 ENCOUNTER — PATIENT MESSAGE (OUTPATIENT)
Dept: OBGYN CLINIC | Age: 54
End: 2024-07-23

## 2024-07-23 RX ORDER — ALPRAZOLAM 0.5 MG/1
0.5 TABLET ORAL 3 TIMES DAILY PRN
COMMUNITY
Start: 2019-06-24

## 2024-07-23 RX ORDER — ACETAMINOPHEN 500 MG
1000 TABLET ORAL EVERY 6 HOURS PRN
COMMUNITY
Start: 2019-12-18

## 2024-07-23 NOTE — PERIOP NOTE
Patient verified name and .  Order for consent NOT found in EHR; patient verifies procedure.   Type 2 surgery, phone assessment complete. Orders NOT received.  Labs per surgeon: None  Labs per anesthesia protocol: None    Pt has h/o gastric sleeve and states that she can't hold her multivitamin or her iron will drop really low. She is agreeable to holding it for 2 days. Chart sent to anesthesia for review and flagged for charge nurse follow-up.     Patient answered medical/surgical history questions at their best of ability. All prior to admission medications documented in EPIC.    Patient instructed to continue taking all prescription medications up to the day of surgery but to take only the following medications the day of surgery according to anesthesia guidelines with a small sip of water: Lexapro. Also, patient is requested to take 2 Tylenol in the morning and then again before bed on the day before surgery. Regular or extra strength may be used.       Patient informed that all vitamins and supplements should be held 7 days prior to surgery and NSAIDS 5 days prior to surgery. Prescription meds to hold: Celebrex- hold 5 days prior.    Patient instructed on the following:    > Arrive at [facility] Entrance, time of arrival to be called the day before by 1700  > NPO after midnight, unless otherwise indicated, including gum, mints, and ice chips  > Responsible adult must drive patient to the hospital, stay during surgery, and patient will need supervision 24 hours after anesthesia  > Use non moisturizing soap in shower the night before surgery and on the morning of surgery  > All piercings must be removed prior to arrival.    > Leave all valuables (money and jewelry) at home but bring insurance card and ID on DOS.   > You may be required to pay a deductible or co-pay on the day of your procedure. You can pre-pay by calling [phone] if your surgery is at the [facility] or [phone] if your surgery is at the

## 2024-07-30 ENCOUNTER — ANESTHESIA EVENT (OUTPATIENT)
Dept: SURGERY | Age: 54
End: 2024-07-30
Payer: COMMERCIAL

## 2024-07-30 NOTE — PERIOP NOTE
Preop department called to notify patient of arrival time for scheduled procedure. Instructions given to   - Arrive at OPC Entrance [address].  - Remain NPO after midnight, unless otherwise indicated, including gum, mints, and ice chips.   - Have a responsible adult to drive patient to the hospital, stay during surgery, and patient will need supervision 24 hours after anesthesia.   - Use antibacterial soap in shower the night before surgery and on the morning of surgery.       Was patient contacted: no  Voicemail left: yes- pts phone   Numbers contacted: [phone]   Arrival time: 0530

## 2024-07-30 NOTE — PERIOP NOTE
Preop department called to notify patient of arrival time for scheduled procedure. Instructions given to   - Arrive at OPC Entrance [address].  - Remain NPO after midnight, unless otherwise indicated, including gum, mints, and ice chips.   - Have a responsible adult to drive patient to the hospital, stay during surgery, and patient will need supervision 24 hours after anesthesia.   - Use antibacterial soap in shower the night before surgery and on the morning of surgery.       Was patient contacted: yes-pt called back   Voicemail left:   Numbers contacted: [phone]   Arrival time: 0530

## 2024-07-30 NOTE — ANESTHESIA PRE PROCEDURE
Department of Anesthesiology  Preprocedure Note       Name:  Mimi Francis   Age:  54 y.o.  :  1970                                          MRN:  [id number]         Date:  2024      Surgeon: Surgeon(s):  ADAM Marie Jr., MD    Procedure: Procedure(s):  ABDOMINOPLASTY    Medications prior to admission:   Prior to Admission medications    Medication Sig Start Date End Date Taking? Authorizing Provider   acetaminophen (TYLENOL) 500 MG tablet Take 2 tablets by mouth every 6 hours as needed (Headache) 19  Yes Karlie Ridley MD   ALPRAZolam (XANAX) 0.5 MG tablet Take 1 tablet by mouth 3 times daily as needed for Anxiety. Max Daily Amount: 1.5 mg  Patient not taking: Reported on 2024  Yes Karlie Ridley MD   Cetirizine HCl (ZYRTEC ALLERGY PO)     Karlie Ridley MD   tiZANidine (ZANAFLEX) 4 MG tablet Take 1 tablet by mouth daily as needed  Patient not taking: Reported on 2024   Karlie Ridley MD   Multiple Vitamin (MULTI-VITAMIN DAILY PO) Take by mouth daily    Karlie Ridley MD   Collagen-Vitamin C-Biotin (COLLAGEN 1500/C) 500-50-0.8 MG CAPS Take by mouth Daily    ProviderKarlie MD   estradiol (CLIMARA) 0.05 MG/24HR Place 1 patch onto the skin once a week  Patient not taking: Reported on 2024   Di Reyes MD   celecoxib (CELEBREX) 200 MG capsule Take 1 capsule by mouth daily 22  Automatic Reconciliation, Ar   escitalopram (LEXAPRO) 20 MG tablet Take 1 tablet by mouth daily    Automatic Reconciliation, Ar   pantoprazole (PROTONIX) 20 MG tablet Take 1 tablet by mouth daily  Patient not taking: Reported on 2024    Automatic Reconciliation, Ar       Current medications:    No current facility-administered medications for this encounter.     Current Outpatient Medications   Medication Sig Dispense Refill   • acetaminophen (TYLENOL) 500 MG tablet Take 2 tablets by mouth every 6 hours as needed (Headache)

## 2024-07-31 ENCOUNTER — ANESTHESIA (OUTPATIENT)
Dept: SURGERY | Age: 54
End: 2024-07-31
Payer: COMMERCIAL

## 2024-07-31 ENCOUNTER — HOSPITAL ENCOUNTER (OUTPATIENT)
Age: 54
Setting detail: OUTPATIENT SURGERY
Discharge: HOME | End: 2024-07-31
Attending: SPECIALIST | Admitting: SPECIALIST
Payer: COMMERCIAL

## 2024-07-31 VITALS
HEIGHT: 67 IN | TEMPERATURE: 97.1 F | BODY MASS INDEX: 37.67 KG/M2 | DIASTOLIC BLOOD PRESSURE: 61 MMHG | RESPIRATION RATE: 16 BRPM | HEART RATE: 93 BPM | WEIGHT: 240 LBS | OXYGEN SATURATION: 95 % | SYSTOLIC BLOOD PRESSURE: 93 MMHG

## 2024-07-31 LAB
ANION GAP SERPL CALC-SCNC: 11 MMOL/L (ref 9–18)
BUN SERPL-MCNC: 21 MG/DL (ref 6–23)
CALCIUM SERPL-MCNC: 9.6 MG/DL (ref 8.8–10.2)
CHLORIDE SERPL-SCNC: 105 MMOL/L (ref 98–107)
CO2 SERPL-SCNC: 26 MMOL/L (ref 20–28)
CREAT SERPL-MCNC: 0.59 MG/DL (ref 0.6–1.1)
ERYTHROCYTE [DISTWIDTH] IN BLOOD BY AUTOMATED COUNT: 13.7 % (ref 11.9–14.6)
GLUCOSE SERPL-MCNC: 83 MG/DL (ref 70–99)
HCT VFR BLD AUTO: 42 % (ref 35.8–46.3)
HGB BLD-MCNC: 14 G/DL (ref 11.7–15.4)
MCH RBC QN AUTO: 32.5 PG (ref 26.1–32.9)
MCHC RBC AUTO-ENTMCNC: 33.3 G/DL (ref 31.4–35)
MCV RBC AUTO: 97.4 FL (ref 82–102)
NRBC # BLD: 0 K/UL (ref 0–0.2)
PLATELET # BLD AUTO: 231 K/UL (ref 150–450)
PMV BLD AUTO: 10.1 FL (ref 9.4–12.3)
POTASSIUM SERPL-SCNC: 4.6 MMOL/L (ref 3.5–5.1)
RBC # BLD AUTO: 4.31 M/UL (ref 4.05–5.2)
SODIUM SERPL-SCNC: 142 MMOL/L (ref 136–145)
WBC # BLD AUTO: 5.9 K/UL (ref 4.3–11.1)

## 2024-07-31 PROCEDURE — 6360000002 HC RX W HCPCS: Performed by: NURSE ANESTHETIST, CERTIFIED REGISTERED

## 2024-07-31 PROCEDURE — C2626 INFUSION PUMP, NON-PROG,TEMP: HCPCS | Performed by: SPECIALIST

## 2024-07-31 PROCEDURE — 2500000003 HC RX 250 WO HCPCS: Performed by: NURSE ANESTHETIST, CERTIFIED REGISTERED

## 2024-07-31 PROCEDURE — 6360000002 HC RX W HCPCS: Performed by: ANESTHESIOLOGY

## 2024-07-31 PROCEDURE — 7100000001 HC PACU RECOVERY - ADDTL 15 MIN: Performed by: SPECIALIST

## 2024-07-31 PROCEDURE — 6360000002 HC RX W HCPCS: Performed by: SPECIALIST

## 2024-07-31 PROCEDURE — 6370000000 HC RX 637 (ALT 250 FOR IP): Performed by: ANESTHESIOLOGY

## 2024-07-31 PROCEDURE — 7100000011 HC PHASE II RECOVERY - ADDTL 15 MIN: Performed by: SPECIALIST

## 2024-07-31 PROCEDURE — 2709999900 HC NON-CHARGEABLE SUPPLY: Performed by: SPECIALIST

## 2024-07-31 PROCEDURE — 2580000003 HC RX 258: Performed by: SPECIALIST

## 2024-07-31 PROCEDURE — 3700000000 HC ANESTHESIA ATTENDED CARE: Performed by: SPECIALIST

## 2024-07-31 PROCEDURE — 6370000000 HC RX 637 (ALT 250 FOR IP): Performed by: SPECIALIST

## 2024-07-31 PROCEDURE — 7100000010 HC PHASE II RECOVERY - FIRST 15 MIN: Performed by: SPECIALIST

## 2024-07-31 PROCEDURE — 80048 BASIC METABOLIC PNL TOTAL CA: CPT

## 2024-07-31 PROCEDURE — 85027 COMPLETE CBC AUTOMATED: CPT

## 2024-07-31 PROCEDURE — 2580000003 HC RX 258: Performed by: ANESTHESIOLOGY

## 2024-07-31 PROCEDURE — 7100000000 HC PACU RECOVERY - FIRST 15 MIN: Performed by: SPECIALIST

## 2024-07-31 PROCEDURE — 3700000001 HC ADD 15 MINUTES (ANESTHESIA): Performed by: SPECIALIST

## 2024-07-31 PROCEDURE — 3600000012 HC SURGERY LEVEL 2 ADDTL 15MIN: Performed by: SPECIALIST

## 2024-07-31 PROCEDURE — 2500000003 HC RX 250 WO HCPCS: Performed by: SPECIALIST

## 2024-07-31 PROCEDURE — 3600000002 HC SURGERY LEVEL 2 BASE: Performed by: SPECIALIST

## 2024-07-31 RX ORDER — CYCLOBENZAPRINE HCL 10 MG
10 TABLET ORAL ONCE
Status: COMPLETED | OUTPATIENT
Start: 2024-07-31 | End: 2024-07-31

## 2024-07-31 RX ORDER — BUPIVACAINE HYDROCHLORIDE 5 MG/ML
INJECTION, SOLUTION EPIDURAL; INTRACAUDAL PRN
Status: DISCONTINUED | OUTPATIENT
Start: 2024-07-31 | End: 2024-07-31 | Stop reason: HOSPADM

## 2024-07-31 RX ORDER — SODIUM CHLORIDE 0.9 % (FLUSH) 0.9 %
5-40 SYRINGE (ML) INJECTION PRN
Status: DISCONTINUED | OUTPATIENT
Start: 2024-07-31 | End: 2024-07-31 | Stop reason: HOSPADM

## 2024-07-31 RX ORDER — SODIUM CHLORIDE, SODIUM LACTATE, POTASSIUM CHLORIDE, CALCIUM CHLORIDE 600; 310; 30; 20 MG/100ML; MG/100ML; MG/100ML; MG/100ML
INJECTION, SOLUTION INTRAVENOUS CONTINUOUS
Status: DISCONTINUED | OUTPATIENT
Start: 2024-07-31 | End: 2024-07-31 | Stop reason: HOSPADM

## 2024-07-31 RX ORDER — ACETAMINOPHEN 500 MG
1000 TABLET ORAL ONCE
Status: COMPLETED | OUTPATIENT
Start: 2024-07-31 | End: 2024-07-31

## 2024-07-31 RX ORDER — GENTAMICIN SULFATE 80 MG/100ML
INJECTION, SOLUTION INTRAVENOUS CONTINUOUS PRN
Status: DISCONTINUED | OUTPATIENT
Start: 2024-07-31 | End: 2024-07-31 | Stop reason: HOSPADM

## 2024-07-31 RX ORDER — PROPOFOL 10 MG/ML
INJECTION, EMULSION INTRAVENOUS PRN
Status: DISCONTINUED | OUTPATIENT
Start: 2024-07-31 | End: 2024-07-31 | Stop reason: SDUPTHER

## 2024-07-31 RX ORDER — KETOROLAC TROMETHAMINE 30 MG/ML
INJECTION, SOLUTION INTRAMUSCULAR; INTRAVENOUS PRN
Status: DISCONTINUED | OUTPATIENT
Start: 2024-07-31 | End: 2024-07-31 | Stop reason: SDUPTHER

## 2024-07-31 RX ORDER — SODIUM CHLORIDE 9 MG/ML
INJECTION, SOLUTION INTRAVENOUS PRN
Status: DISCONTINUED | OUTPATIENT
Start: 2024-07-31 | End: 2024-07-31 | Stop reason: HOSPADM

## 2024-07-31 RX ORDER — PROCHLORPERAZINE EDISYLATE 5 MG/ML
5 INJECTION INTRAMUSCULAR; INTRAVENOUS
Status: DISCONTINUED | OUTPATIENT
Start: 2024-07-31 | End: 2024-07-31 | Stop reason: HOSPADM

## 2024-07-31 RX ORDER — LIDOCAINE HYDROCHLORIDE 20 MG/ML
INJECTION, SOLUTION EPIDURAL; INFILTRATION; INTRACAUDAL; PERINEURAL PRN
Status: DISCONTINUED | OUTPATIENT
Start: 2024-07-31 | End: 2024-07-31 | Stop reason: SDUPTHER

## 2024-07-31 RX ORDER — MIDAZOLAM HYDROCHLORIDE 2 MG/2ML
2 INJECTION, SOLUTION INTRAMUSCULAR; INTRAVENOUS
Status: DISCONTINUED | OUTPATIENT
Start: 2024-07-31 | End: 2024-07-31 | Stop reason: HOSPADM

## 2024-07-31 RX ORDER — MIDAZOLAM HYDROCHLORIDE 1 MG/ML
INJECTION INTRAMUSCULAR; INTRAVENOUS PRN
Status: DISCONTINUED | OUTPATIENT
Start: 2024-07-31 | End: 2024-07-31 | Stop reason: SDUPTHER

## 2024-07-31 RX ORDER — SODIUM CHLORIDE 0.9 % (FLUSH) 0.9 %
5-40 SYRINGE (ML) INJECTION EVERY 12 HOURS SCHEDULED
Status: DISCONTINUED | OUTPATIENT
Start: 2024-07-31 | End: 2024-07-31 | Stop reason: HOSPADM

## 2024-07-31 RX ORDER — KETAMINE HYDROCHLORIDE 50 MG/ML
INJECTION, SOLUTION INTRAMUSCULAR; INTRAVENOUS PRN
Status: DISCONTINUED | OUTPATIENT
Start: 2024-07-31 | End: 2024-07-31 | Stop reason: SDUPTHER

## 2024-07-31 RX ORDER — FENTANYL CITRATE 50 UG/ML
25 INJECTION, SOLUTION INTRAMUSCULAR; INTRAVENOUS EVERY 5 MIN PRN
Status: DISCONTINUED | OUTPATIENT
Start: 2024-07-31 | End: 2024-07-31 | Stop reason: HOSPADM

## 2024-07-31 RX ORDER — MEPERIDINE HYDROCHLORIDE 25 MG/ML
12.5 INJECTION INTRAMUSCULAR; INTRAVENOUS; SUBCUTANEOUS EVERY 5 MIN PRN
Status: DISCONTINUED | OUTPATIENT
Start: 2024-07-31 | End: 2024-07-31 | Stop reason: HOSPADM

## 2024-07-31 RX ORDER — NALOXONE HYDROCHLORIDE 0.4 MG/ML
INJECTION, SOLUTION INTRAMUSCULAR; INTRAVENOUS; SUBCUTANEOUS PRN
Status: DISCONTINUED | OUTPATIENT
Start: 2024-07-31 | End: 2024-07-31 | Stop reason: HOSPADM

## 2024-07-31 RX ORDER — ONDANSETRON 2 MG/ML
4 INJECTION INTRAMUSCULAR; INTRAVENOUS
Status: DISCONTINUED | OUTPATIENT
Start: 2024-07-31 | End: 2024-07-31 | Stop reason: HOSPADM

## 2024-07-31 RX ORDER — HYDROMORPHONE HYDROCHLORIDE 2 MG/ML
INJECTION, SOLUTION INTRAMUSCULAR; INTRAVENOUS; SUBCUTANEOUS PRN
Status: DISCONTINUED | OUTPATIENT
Start: 2024-07-31 | End: 2024-07-31 | Stop reason: SDUPTHER

## 2024-07-31 RX ORDER — ROCURONIUM BROMIDE 10 MG/ML
INJECTION, SOLUTION INTRAVENOUS PRN
Status: DISCONTINUED | OUTPATIENT
Start: 2024-07-31 | End: 2024-07-31 | Stop reason: SDUPTHER

## 2024-07-31 RX ORDER — EPHEDRINE SULFATE 5 MG/ML
INJECTION INTRAVENOUS PRN
Status: DISCONTINUED | OUTPATIENT
Start: 2024-07-31 | End: 2024-07-31 | Stop reason: SDUPTHER

## 2024-07-31 RX ORDER — HEPARIN SODIUM 5000 [USP'U]/ML
5000 INJECTION, SOLUTION INTRAVENOUS; SUBCUTANEOUS ONCE
Status: COMPLETED | OUTPATIENT
Start: 2024-07-31 | End: 2024-07-31

## 2024-07-31 RX ORDER — DEXTROSE MONOHYDRATE 100 MG/ML
INJECTION, SOLUTION INTRAVENOUS CONTINUOUS PRN
Status: DISCONTINUED | OUTPATIENT
Start: 2024-07-31 | End: 2024-07-31 | Stop reason: HOSPADM

## 2024-07-31 RX ORDER — OXYCODONE HYDROCHLORIDE 5 MG/1
5 TABLET ORAL
Status: COMPLETED | OUTPATIENT
Start: 2024-07-31 | End: 2024-07-31

## 2024-07-31 RX ORDER — HYDROMORPHONE HYDROCHLORIDE 2 MG/ML
0.5 INJECTION, SOLUTION INTRAMUSCULAR; INTRAVENOUS; SUBCUTANEOUS EVERY 5 MIN PRN
Status: DISCONTINUED | OUTPATIENT
Start: 2024-07-31 | End: 2024-07-31 | Stop reason: HOSPADM

## 2024-07-31 RX ORDER — IBUPROFEN 600 MG/1
1 TABLET ORAL PRN
Status: DISCONTINUED | OUTPATIENT
Start: 2024-07-31 | End: 2024-07-31 | Stop reason: HOSPADM

## 2024-07-31 RX ORDER — DIPHENHYDRAMINE HYDROCHLORIDE 50 MG/ML
12.5 INJECTION INTRAMUSCULAR; INTRAVENOUS
Status: DISCONTINUED | OUTPATIENT
Start: 2024-07-31 | End: 2024-07-31 | Stop reason: HOSPADM

## 2024-07-31 RX ORDER — APREPITANT 40 MG/1
40 CAPSULE ORAL ONCE
Status: DISCONTINUED | OUTPATIENT
Start: 2024-07-31 | End: 2024-07-31 | Stop reason: CLARIF

## 2024-07-31 RX ORDER — GLYCOPYRROLATE 0.2 MG/ML
INJECTION INTRAMUSCULAR; INTRAVENOUS PRN
Status: DISCONTINUED | OUTPATIENT
Start: 2024-07-31 | End: 2024-07-31 | Stop reason: SDUPTHER

## 2024-07-31 RX ORDER — DEXAMETHASONE SODIUM PHOSPHATE 10 MG/ML
INJECTION INTRAMUSCULAR; INTRAVENOUS PRN
Status: DISCONTINUED | OUTPATIENT
Start: 2024-07-31 | End: 2024-07-31 | Stop reason: SDUPTHER

## 2024-07-31 RX ORDER — LIDOCAINE HYDROCHLORIDE 10 MG/ML
1 INJECTION, SOLUTION INFILTRATION; PERINEURAL
Status: DISCONTINUED | OUTPATIENT
Start: 2024-07-31 | End: 2024-07-31 | Stop reason: HOSPADM

## 2024-07-31 RX ORDER — NEOSTIGMINE METHYLSULFATE 1 MG/ML
INJECTION, SOLUTION INTRAVENOUS PRN
Status: DISCONTINUED | OUTPATIENT
Start: 2024-07-31 | End: 2024-07-31 | Stop reason: SDUPTHER

## 2024-07-31 RX ORDER — ONDANSETRON 2 MG/ML
INJECTION INTRAMUSCULAR; INTRAVENOUS PRN
Status: DISCONTINUED | OUTPATIENT
Start: 2024-07-31 | End: 2024-07-31 | Stop reason: SDUPTHER

## 2024-07-31 RX ADMIN — KETAMINE HYDROCHLORIDE 10 MG: 50 INJECTION, SOLUTION INTRAMUSCULAR; INTRAVENOUS at 07:35

## 2024-07-31 RX ADMIN — EPHEDRINE SULFATE 7.5 MG: 5 INJECTION INTRAVENOUS at 07:43

## 2024-07-31 RX ADMIN — TRANEXAMIC ACID 1000 MG: 100 INJECTION, SOLUTION INTRAVENOUS at 06:02

## 2024-07-31 RX ADMIN — ROCURONIUM BROMIDE 50 MG: 10 INJECTION, SOLUTION INTRAVENOUS at 07:07

## 2024-07-31 RX ADMIN — ACETAMINOPHEN 1000 MG: 500 TABLET, FILM COATED ORAL at 06:02

## 2024-07-31 RX ADMIN — OXYCODONE HYDROCHLORIDE 5 MG: 5 TABLET ORAL at 13:38

## 2024-07-31 RX ADMIN — PROPOFOL 160 MG: 10 INJECTION, EMULSION INTRAVENOUS at 07:06

## 2024-07-31 RX ADMIN — Medication 2 G: at 11:09

## 2024-07-31 RX ADMIN — HYDROMORPHONE HYDROCHLORIDE 0.5 MG: 2 INJECTION, SOLUTION INTRAMUSCULAR; INTRAVENOUS; SUBCUTANEOUS at 12:26

## 2024-07-31 RX ADMIN — SODIUM CHLORIDE, SODIUM LACTATE, POTASSIUM CHLORIDE, AND CALCIUM CHLORIDE: 600; 310; 30; 20 INJECTION, SOLUTION INTRAVENOUS at 08:01

## 2024-07-31 RX ADMIN — ROCURONIUM BROMIDE 15 MG: 10 INJECTION, SOLUTION INTRAVENOUS at 09:07

## 2024-07-31 RX ADMIN — HEPARIN SODIUM 5000 UNITS: 5000 INJECTION INTRAVENOUS; SUBCUTANEOUS at 06:04

## 2024-07-31 RX ADMIN — Medication 2 G: at 07:16

## 2024-07-31 RX ADMIN — KETOROLAC TROMETHAMINE 30 MG: 30 INJECTION, SOLUTION INTRAMUSCULAR; INTRAVENOUS at 11:53

## 2024-07-31 RX ADMIN — SODIUM CHLORIDE, SODIUM LACTATE, POTASSIUM CHLORIDE, AND CALCIUM CHLORIDE: 600; 310; 30; 20 INJECTION, SOLUTION INTRAVENOUS at 06:02

## 2024-07-31 RX ADMIN — HYDROMORPHONE HYDROCHLORIDE 0.4 MG: 2 INJECTION INTRAMUSCULAR; INTRAVENOUS; SUBCUTANEOUS at 07:07

## 2024-07-31 RX ADMIN — MIDAZOLAM 2 MG: 1 INJECTION INTRAMUSCULAR; INTRAVENOUS at 06:55

## 2024-07-31 RX ADMIN — PROPOFOL 40 MG: 10 INJECTION, EMULSION INTRAVENOUS at 09:00

## 2024-07-31 RX ADMIN — GLYCOPYRROLATE 0.8 MG: 0.2 INJECTION INTRAMUSCULAR; INTRAVENOUS at 11:31

## 2024-07-31 RX ADMIN — KETAMINE HYDROCHLORIDE 10 MG: 50 INJECTION, SOLUTION INTRAMUSCULAR; INTRAVENOUS at 08:33

## 2024-07-31 RX ADMIN — CYCLOBENZAPRINE 10 MG: 10 TABLET, FILM COATED ORAL at 12:42

## 2024-07-31 RX ADMIN — EPHEDRINE SULFATE 5 MG: 5 INJECTION INTRAVENOUS at 07:41

## 2024-07-31 RX ADMIN — Medication 5 MG: at 11:31

## 2024-07-31 RX ADMIN — KETAMINE HYDROCHLORIDE 10 MG: 50 INJECTION, SOLUTION INTRAMUSCULAR; INTRAVENOUS at 08:05

## 2024-07-31 RX ADMIN — KETAMINE HYDROCHLORIDE 20 MG: 50 INJECTION, SOLUTION INTRAMUSCULAR; INTRAVENOUS at 07:06

## 2024-07-31 RX ADMIN — KETAMINE HYDROCHLORIDE 10 MG: 50 INJECTION, SOLUTION INTRAMUSCULAR; INTRAVENOUS at 09:00

## 2024-07-31 RX ADMIN — LIDOCAINE HYDROCHLORIDE 80 MG: 20 INJECTION, SOLUTION EPIDURAL; INFILTRATION; INTRACAUDAL; PERINEURAL at 07:06

## 2024-07-31 RX ADMIN — EPHEDRINE SULFATE 7.5 MG: 5 INJECTION INTRAVENOUS at 08:17

## 2024-07-31 RX ADMIN — HYDROMORPHONE HYDROCHLORIDE 0.6 MG: 2 INJECTION INTRAMUSCULAR; INTRAVENOUS; SUBCUTANEOUS at 09:00

## 2024-07-31 RX ADMIN — ONDANSETRON 4 MG: 2 INJECTION INTRAMUSCULAR; INTRAVENOUS at 10:46

## 2024-07-31 RX ADMIN — DEXAMETHASONE SODIUM PHOSPHATE 10 MG: 10 INJECTION INTRAMUSCULAR; INTRAVENOUS at 07:16

## 2024-07-31 RX ADMIN — HYDROMORPHONE HYDROCHLORIDE 0.4 MG: 2 INJECTION INTRAMUSCULAR; INTRAVENOUS; SUBCUTANEOUS at 09:20

## 2024-07-31 ASSESSMENT — PAIN DESCRIPTION - LOCATION: LOCATION: ABDOMEN

## 2024-07-31 ASSESSMENT — PAIN - FUNCTIONAL ASSESSMENT: PAIN_FUNCTIONAL_ASSESSMENT: 0-10

## 2024-07-31 ASSESSMENT — PAIN DESCRIPTION - DESCRIPTORS: DESCRIPTORS: ACHING

## 2024-07-31 ASSESSMENT — PAIN SCALES - GENERAL: PAINLEVEL_OUTOF10: 7

## 2024-07-31 NOTE — DISCHARGE INSTRUCTIONS
Keep dressing/garment Clean, Dry, and Intact   Empty, record, and strip TOÑA drains when half full or less. Recharge TOÑA drains well after each emptying. (Squeeze them tight!)  Keep HOB elevated. Elevate knees with pillows. Elevate feet above heart level to aid in swelling.   Take Rxs as directed.     Call Dr. Marie for any issues.  Be sure to go to your follow-up

## 2024-07-31 NOTE — ANESTHESIA POSTPROCEDURE EVALUATION
Department of Anesthesiology  Postprocedure Note    Patient: Mimi Francis  MRN: [id number]  YOB: 1970  Date of evaluation: 7/31/2024    Procedure Summary       Date: 07/31/24 Room / Location: [facility] OP OR 05 / [facility]    Anesthesia Start: 0655 Anesthesia Stop: 1158    Procedure: ABDOMINOPLASTY (Abdomen) Diagnosis:       Diastasis, muscle      Intertrigo      Low back pain, unspecified back pain laterality, unspecified chronicity, unspecified whether sciatica present      (Diastasis, muscle [M62.00])      (Intertrigo [L30.4])      (Low back pain, unspecified back pain laterality, unspecified chronicity, unspecified whether sciatica present [M54.50])    Surgeons: ADAM Marie Jr., MD Responsible Provider: Charlene Bustamante MD    Anesthesia Type: General ASA Status: 2            Anesthesia Type: General    Faustina Phase I: Faustina Score: 8    Faustina Phase II:      Anesthesia Post Evaluation    Patient location during evaluation: PACU  Patient participation: complete - patient participated  Level of consciousness: awake and alert  Airway patency: patent  Nausea & Vomiting: no nausea and no vomiting  Cardiovascular status: hemodynamically stable  Respiratory status: acceptable  Hydration status: euvolemic  Multimodal analgesia pain management approach  Pain management: adequate    No notable events documented.

## 2024-07-31 NOTE — OP NOTE
[facility]  OPERATIVE REPORT     Patient: Mimi Francis  YOB: 1970  MRN: [id number]    Date of Procedure: 7/31/2024     PREOPERATIVE DIAGNOSES:  1.  Abdominal wall laxity.  2.  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  Symptomatic rectus diastasis  2.  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     COMPLICATIONS:  None.     SPECIMENS REMOVED:  none.     IMPLANTS:  none.     ESTIMATED BLOOD LOSS:  <100 cc      INDICATIONS:  This is a pleasant 55 yo woman with the above diagnosis desiring operative intervention.  Full discussion with the patient regarding risks, benefits, goals, details of surgery was had.  Risks include, but are not limited to infection, bleeding, asymmetry, wound healing problems, scarring, incomplete resolution of symptoms, recurrence of diastasis.  Nonsurgical complications include, but are not limited to deep venous thrombosis and pulmonary embolus, although the risk here is very low.  Methods employed to minimize, but not negate these risks were discussed preoperatively and her importance in ambulation postoperatively for the first month after surgery was given as a key component of her minimizing her risk.  She understands all this and desires to proceed.     DESCRIPTION OF PROCEDURE:  After being correctly identified and being marked preoperatively, the patient was brought to the OR, successful GETA was obtained.  The areas were prepped and draped in sterile fashion.  Monterroso catheter, SCD hose and padding of all pressure points was undertaken.  Proper time-out procedure was carried out.  A circumferential incision was made with a 15-blade scalpel around the

## 2025-01-21 ENCOUNTER — PROCEDURE VISIT (OUTPATIENT)
Dept: OBGYN CLINIC | Age: 55
End: 2025-01-21
Payer: COMMERCIAL

## 2025-01-21 ENCOUNTER — OFFICE VISIT (OUTPATIENT)
Dept: OBGYN CLINIC | Age: 55
End: 2025-01-21
Payer: COMMERCIAL

## 2025-01-21 VITALS
SYSTOLIC BLOOD PRESSURE: 116 MMHG | BODY MASS INDEX: 36.57 KG/M2 | HEIGHT: 67 IN | DIASTOLIC BLOOD PRESSURE: 73 MMHG | WEIGHT: 233 LBS

## 2025-01-21 DIAGNOSIS — Z80.41 FAMILY HISTORY OF OVARIAN CANCER: Primary | ICD-10-CM

## 2025-01-21 PROCEDURE — 76830 TRANSVAGINAL US NON-OB: CPT | Performed by: OBSTETRICS & GYNECOLOGY

## 2025-01-21 PROCEDURE — 99212 OFFICE O/P EST SF 10 MIN: CPT | Performed by: OBSTETRICS & GYNECOLOGY

## 2025-01-21 SDOH — ECONOMIC STABILITY: FOOD INSECURITY: WITHIN THE PAST 12 MONTHS, THE FOOD YOU BOUGHT JUST DIDN'T LAST AND YOU DIDN'T HAVE MONEY TO GET MORE.: NEVER TRUE

## 2025-01-21 SDOH — ECONOMIC STABILITY: FOOD INSECURITY: WITHIN THE PAST 12 MONTHS, YOU WORRIED THAT YOUR FOOD WOULD RUN OUT BEFORE YOU GOT MONEY TO BUY MORE.: NEVER TRUE

## 2025-01-21 NOTE — PROGRESS NOTES
Back for follow=up US of right ovary, d/t recently starting ERT and FH ovarian Ca.. S/p RATLH for endo 4-5 yrs ago.    Gyn ultrasound interpretation:  TA & TV images were reviewed by me personally.The right ovary shows: no masses seen.  The left ovary shows: previously removed.  No FF      Miim was seen today for ultrasound, follow-up and ovary check.    Diagnoses and all orders for this visit:    Family history of ovarian cancer - US OK.  Is interested in genetic counseling / poss screening.  Will refer.         No follow-ups on file.    Andrea Keller MD

## (undated) DEVICE — SOLUTION IRRIGATION H2O 0797305] ICU MEDICAL INC]

## (undated) DEVICE — TRAY PREP DRY W/ PREM GLV 2 APPL 6 SPNG 2 UNDPD 1 OVERWRAP

## (undated) DEVICE — CONTAINER SPEC HISTOLOGY 900ML POLYPR

## (undated) DEVICE — APPLICATOR COT-TIP 6IN WOOD -- 2/PK STRL

## (undated) DEVICE — SYR 10ML LUER LOK 1/5ML GRAD --

## (undated) DEVICE — DRAPE,UNDERBUTTOCKS,PCH,STERILE: Brand: MEDLINE

## (undated) DEVICE — DRAPE IRRIG FLD WRM W44XL44IN W/ AORN STD PRTBL INTRATEMP

## (undated) DEVICE — TIP COVER ACCESSORY

## (undated) DEVICE — SYRINGE IRRIG 60ML SFT PLIABLE BLB EZ TO GRP 1 HND USE W/

## (undated) DEVICE — SUTURE MONOCRYL STRATAFIX SPRL + SZ 4-0 L12IN ABSRB UD PS-2 SXMP1B117

## (undated) DEVICE — SHEET, T, LAPAROTOMY, STERILE: Brand: MEDLINE

## (undated) DEVICE — NEEDLE HYPO 21GA L1.5IN INTRAMUSCULAR S STL LATCH BVL UP

## (undated) DEVICE — COVER,TABLE,44X76,STERILE: Brand: MEDLINE

## (undated) DEVICE — 2, DISPOSABLE SUCTION/IRRIGATOR WITHOUT DISPOSABLE TIP: Brand: STRYKEFLOW

## (undated) DEVICE — SUTURE VCRL SZ 0 L18IN ABSRB UD L36MM CT-1 1/2 CIR J840D

## (undated) DEVICE — MARKER SURG SKIN UTIL BLK REG TIP NONSMEARING W/ 6IN RUL

## (undated) DEVICE — 3M™ TEGADERM™ TRANSPARENT FILM DRESSING FRAME STYLE, 1626W, 4 IN X 4-3/4 IN (10 CM X 12 CM), 50/CT 4CT/CASE: Brand: 3M™ TEGADERM™

## (undated) DEVICE — CYSTO/BLADDER IRRIGATION SET, REGULATING CLAMP

## (undated) DEVICE — TRI-LUMEN FILTERED TUBE SET WITH ACTIVATED CHARCOAL FILTER: Brand: AIRSEAL

## (undated) DEVICE — ROBOTIC HYSTERECTOMY: Brand: MEDLINE INDUSTRIES, INC.

## (undated) DEVICE — ARM DRAPE

## (undated) DEVICE — SUTURE ABSRB X-1 REV CUT 1/2 CIR 22MM UD BRAID 27IN SZ 3-0 J458H

## (undated) DEVICE — Device

## (undated) DEVICE — SURGICAL PROCEDURE PACK BASIC ST FRANCIS

## (undated) DEVICE — JELLY LUBRICATING 10GM PREFIL SYR LUBE

## (undated) DEVICE — FILTER SMK EVAC FLO CLR MEGADYNE

## (undated) DEVICE — COBRA GRASPER: Brand: ENDOWRIST

## (undated) DEVICE — REM POLYHESIVE ADULT PATIENT RETURN ELECTRODE: Brand: VALLEYLAB

## (undated) DEVICE — SUTURE PERMA-HAND SZ 2-0 L30IN NONABSORBABLE BLK L26MM SH K833H

## (undated) DEVICE — VESSEL SEALER XI EXTENDED DISP -- VESSEL

## (undated) DEVICE — DERMABOND SKIN ADH 0.7ML -- DERMABOND ADVANCED 12/BX

## (undated) DEVICE — BAG DRNGE 4000ML CONT IRRIG ROUNDED TEARDROP SHP DISP

## (undated) DEVICE — ELECTRODE PT RET AD L9FT HI MOIST COND ADH HYDRGEL CORDED

## (undated) DEVICE — ADHESIVE SKIN CLOSURE WND 8.661X1.5 IN 22 CM LIQUIBAND SECUR

## (undated) DEVICE — GAUZE,SPONGE,2"X2",8PLY,STERILE,LF,2'S: Brand: MEDLINE

## (undated) DEVICE — Z DISCONTINUED NO SUB SUTURE CHROMIC GUT SZ 4-0 L18IN ABSRB BRN L19MM PS-2 3/8 1637G

## (undated) DEVICE — INTENDED FOR TISSUE SEPARATION, AND OTHER PROCEDURES THAT REQUIRE A SHARP SURGICAL BLADE TO PUNCTURE OR CUT.: Brand: BARD-PARKER ® STAINLESS STEEL BLADES

## (undated) DEVICE — DRAPE,TOP,102X53,STERILE: Brand: MEDLINE

## (undated) DEVICE — GOWN,REINF,POLY,ECL,PP SLV,XL: Brand: MEDLINE

## (undated) DEVICE — MEGA SUTURECUT ND: Brand: ENDOWRIST

## (undated) DEVICE — SPONGE LAP W18XL18IN WHT COT 4 PLY FLD STRUNG RADPQ DISP ST 2 PER PACK

## (undated) DEVICE — CATHETER URETH 16FR BLLN 5CC SIL ALLY W/ SIL HYDRGEL 2 W F

## (undated) DEVICE — BLADELESS OBTURATOR: Brand: WECK VISTA

## (undated) DEVICE — SUTURE VCRL SZ 4-0 L18IN ABSRB UD L19MM PS-2 3/8 CIR PRIM J496H

## (undated) DEVICE — SUTURE VICRYL SZ 0 L36IN ABSRB UD L36MM CT-1 1/2 CIR J946H

## (undated) DEVICE — GARMENT,MEDLINE,DVT,INT,CALF,MED, GEN2: Brand: MEDLINE

## (undated) DEVICE — DRAIN SURG 19FR 100% SIL RADPQ RND CHN FULL FLUT

## (undated) DEVICE — Device: Brand: MEDCO MANUFACTURING INC

## (undated) DEVICE — UNIVERSAL DRAPES: Brand: MEDLINE INDUSTRIES, INC.

## (undated) DEVICE — SUTURE VICRYL SZ 2-0 L36IN ABSRB UD L36MM CT-1 1/2 CIR J945H

## (undated) DEVICE — APPLICATOR MEDICATED 26 CC SOLUTION HI LT ORNG CHLORAPREP

## (undated) DEVICE — CONTROL SYRINGE LUER-LOCK TIP: Brand: MONOJECT

## (undated) DEVICE — VISUALIZATION SYSTEM: Brand: CLEARIFY

## (undated) DEVICE — PUMP PAIN MGMT 400ML 4ML/HR 2 W/ SIL SOAK CATH FOR ABD

## (undated) DEVICE — 40585 XL ADVANCED TRENDELENBURG POSITIONING KIT: Brand: 40585 XL ADVANCED TRENDELENBURG POSITIONING KIT

## (undated) DEVICE — (D)PREP SKN CHLRAPRP APPL 26ML -- CONVERT TO ITEM 371833

## (undated) DEVICE — TOTAL 1-LAYER TRAY, LATEX FOLEY, 16FR 10: Brand: MEDLINE

## (undated) DEVICE — INSUFFLATION NEEDLE TO ESTABLISH PNEUMOPERITONEUM.: Brand: INSUFFLATION NEEDLE

## (undated) DEVICE — COLUMN DRAPE

## (undated) DEVICE — CARDINAL HEALTH FLEXIBLE LIGHT HANDLE COVER: Brand: CARDINAL HEALTH

## (undated) DEVICE — SCISSORS SURG DIA8MM MPLR CRV ENDOWRIST

## (undated) DEVICE — NEEDLE HYPO 25GA L1.5IN BLU POLYPR HUB S STL REG BVL STR

## (undated) DEVICE — SEAL UNIV 5-8MM DISP BX/10 -- DA VINCI XI - SNGL USE

## (undated) DEVICE — AIRSEAL 8 MM ACCESS PORT AND LOW PROFILE OBTURATOR WITH BLADELESS OPTICAL TIP, 120 MM LENGTH: Brand: AIRSEAL

## (undated) DEVICE — RESERVOIR,SUCTION,100CC,SILICONE: Brand: MEDLINE

## (undated) DEVICE — SUTURE ETHIBOND SZ 1 L18IN NONABSORBABLE CTX L48MM 1/2 CIR CX30D

## (undated) DEVICE — PAD,ABDOMINAL,5"X9",ST,LF,25/BX: Brand: MEDLINE INDUSTRIES, INC.

## (undated) DEVICE — SOLUTION IV 1000ML 0.9% SOD CHL

## (undated) DEVICE — SURGIFOAM SPNG SZ 100

## (undated) DEVICE — 2000CC GUARDIAN II: Brand: GUARDIAN

## (undated) DEVICE — PAD MATERNITY 11IN W/TAILS -- STRL

## (undated) DEVICE — BANDAGE,GAUZE,BULKEE II,4.5"X4.1YD,STRL: Brand: MEDLINE

## (undated) DEVICE — SYR 50ML LR LCK 1ML GRAD NSAF --

## (undated) DEVICE — TIP IU L10CM DIA6.7MM GRN SIL FLX DISP RUMI II

## (undated) DEVICE — 1LYRTR 16FR10ML 100%SILI SNAP: Brand: MEDLINE INDUSTRIES, INC.

## (undated) DEVICE — SOLUTION IRRIG 1000ML 0.9% SOD CHL USP POUR PLAS BTL

## (undated) DEVICE — ELECTRO LUBE IS A SINGLE PATIENT USE DEVICE THAT IS INTENDED TO BE USED ON ELECTROSURGICAL ELECTRODES TO REDUCE STICKING.: Brand: KEY SURGICAL ELECTRO LUBE

## (undated) DEVICE — BLADE ELECTRODE: Brand: VALLEYLAB

## (undated) DEVICE — DRAPE TWL SURG 16X26IN BLU ORB04] ALLCARE INC]

## (undated) DEVICE — SOLUTION LACTATED RINGERS INJECTION USP

## (undated) DEVICE — SOLUTION IRRIG 1000ML H2O STRL BLT

## (undated) DEVICE — BLLN OCCL PERITONM COLPOPNEUMO --

## (undated) DEVICE — BASIC SINGLE BASIN-LF: Brand: MEDLINE INDUSTRIES, INC.

## (undated) DEVICE — SUTURE V-LOC 180 SZ 0 L9IN ABSRB GRN GS-21 L37MM 1/2 CIR VLOCL0346

## (undated) DEVICE — GLOVE ORANGE PI 8 1/2   MSG9085

## (undated) DEVICE — NEPTUNE E-SEP SMOKE EVACUATION PENCIL, COATED, 70MM BLADE, PUSH BUTTON SWITCH: Brand: NEPTUNE E-SEP

## (undated) DEVICE — LEGGINGS, PAIR, 31X48, STERILE: Brand: MEDLINE

## (undated) DEVICE — SPONGE LAP 18X18IN STRL -- 5/PK

## (undated) DEVICE — SUTURE PDS II SZ 1 L96IN ABSRB VLT TP-1 L65MM 1/2 CIR Z880G